# Patient Record
Sex: FEMALE | Race: WHITE | NOT HISPANIC OR LATINO | Employment: PART TIME | URBAN - METROPOLITAN AREA
[De-identification: names, ages, dates, MRNs, and addresses within clinical notes are randomized per-mention and may not be internally consistent; named-entity substitution may affect disease eponyms.]

---

## 2020-01-18 ENCOUNTER — HOSPITAL ENCOUNTER (EMERGENCY)
Facility: HOSPITAL | Age: 45
Discharge: HOME | End: 2020-01-18
Attending: EMERGENCY MEDICINE

## 2020-01-18 VITALS
HEART RATE: 80 BPM | RESPIRATION RATE: 16 BRPM | TEMPERATURE: 99 F | OXYGEN SATURATION: 99 % | WEIGHT: 125 LBS | DIASTOLIC BLOOD PRESSURE: 57 MMHG | BODY MASS INDEX: 23 KG/M2 | SYSTOLIC BLOOD PRESSURE: 108 MMHG | HEIGHT: 62 IN

## 2020-01-18 DIAGNOSIS — R52 PAIN: ICD-10-CM

## 2020-01-18 DIAGNOSIS — M25.561 ACUTE PAIN OF RIGHT KNEE: Primary | ICD-10-CM

## 2020-01-18 PROCEDURE — 99283 EMERGENCY DEPT VISIT LOW MDM: CPT | Mod: 25

## 2020-01-18 PROCEDURE — 25000003 PHARM REV CODE 250: Performed by: NURSE PRACTITIONER

## 2020-01-18 RX ORDER — HYDROCODONE BITARTRATE AND ACETAMINOPHEN 7.5; 325 MG/1; MG/1
1 TABLET ORAL EVERY 4 HOURS PRN
Qty: 18 TABLET | Refills: 0 | Status: SHIPPED | OUTPATIENT
Start: 2020-01-18

## 2020-01-18 RX ORDER — HYDROCODONE BITARTRATE AND ACETAMINOPHEN 7.5; 325 MG/1; MG/1
1 TABLET ORAL
Status: COMPLETED | OUTPATIENT
Start: 2020-01-18 | End: 2020-01-18

## 2020-01-18 RX ADMIN — HYDROCODONE BITARTRATE AND ACETAMINOPHEN 1 TABLET: 7.5; 325 TABLET ORAL at 08:01

## 2020-01-19 NOTE — ED NOTES
Pt c/o R knee pain after hurting it yesterday. Pt is resting on stretcher, NAD, aaax3 and has friend in room,.

## 2020-01-19 NOTE — ED PROVIDER NOTES
Encounter Date: 1/18/2020       History     Chief Complaint   Patient presents with    Knee Pain     right     Presents with R knee after stepping off a step stool yesterday  Has limited ROM flexing knee due to pain        Review of patient's allergies indicates:   Allergen Reactions    Sulfa (sulfonamide antibiotics) Rash     History reviewed. No pertinent past medical history.  Past Surgical History:   Procedure Laterality Date    HYSTERECTOMY       History reviewed. No pertinent family history.  Social History     Tobacco Use    Smoking status: Current Every Day Smoker     Types: Cigarettes    Smokeless tobacco: Never Used   Substance Use Topics    Alcohol use: Yes    Drug use: Yes     Types: Marijuana     Review of Systems   Constitutional: Negative for fever.   Respiratory: Negative for cough, shortness of breath and wheezing.    Cardiovascular: Negative for chest pain, palpitations and leg swelling.   Gastrointestinal: Negative for abdominal pain, diarrhea, nausea and vomiting.   Genitourinary: Negative for dysuria.   Musculoskeletal: Negative for back pain.        Right knee pain   Skin: Negative for rash.   Neurological: Negative for weakness.       Physical Exam     Initial Vitals [01/18/20 1846]   BP Pulse Resp Temp SpO2   (!) 108/57 80 16 98.7 °F (37.1 °C) 99 %      MAP       --         Physical Exam    Constitutional: She appears well-developed and well-nourished.   HENT:   Head: Normocephalic and atraumatic.   Eyes: Conjunctivae are normal.   Neck: Normal range of motion. Neck supple.   Cardiovascular: Normal rate and regular rhythm.   Pulmonary/Chest: Breath sounds normal. No respiratory distress.   Musculoskeletal: Normal range of motion.   Right knee with mild swelling  Increased pain with flexion.  Decreased ROM with flexion  Pt has full extension   Neurological: She is alert and oriented to person, place, and time. No sensory deficit. GCS score is 15. GCS eye subscore is 4. GCS verbal  subscore is 5. GCS motor subscore is 6.   Skin: Skin is warm and dry.   Psychiatric: She has a normal mood and affect. Thought content normal.         ED Course   Splint Application  Date/Time: 1/18/2020 8:17 PM  Performed by: Kelin Alejandre NP  Authorized by: Viki Ibrahim MD   Location details: right knee  Post-procedure: The splinted body part was neurovascularly unchanged following the procedure.  Patient tolerance: Patient tolerated the procedure well with no immediate complications  Comments: Knee immobilizer placed with crutch training        Labs Reviewed - No data to display       Imaging Results          X-Ray Knee Complete 4 or more Views Right (Final result)  Result time 01/18/20 19:30:31   Procedure changed from X-Ray Knee 3 View Right     Final result by Siddharth Frazier MD (01/18/20 19:30:31)                 Impression:      Normal right knee.      Electronically signed by: Siddharth Frazier MD  Date:    01/18/2020  Time:    19:30             Narrative:    EXAMINATION:  XR KNEE COMP 4 OR MORE VIEWS RIGHT    CLINICAL HISTORY:  pain;.  Pain, unspecified    COMPARISON:  None.    FINDINGS:  4 views are negative for fracture, dislocation, or osseous destructive lesion. No significant arthritic change or joint effusion is identified.                                 Medical Decision Making:   Initial Assessment:   Right knee pain  Have discussed this pt with DR. Ibrahim                                 Clinical Impression:       ICD-10-CM ICD-9-CM   1. Acute pain of right knee M25.561 719.46   2. Pain R52 780.96                             Kelin Alejandre NP  01/18/20 2016       Kelin Alejandre NP  01/18/20 2017

## 2020-01-27 ENCOUNTER — TELEPHONE (OUTPATIENT)
Dept: ORTHOPEDICS | Facility: CLINIC | Age: 45
End: 2020-01-27

## 2020-01-27 NOTE — TELEPHONE ENCOUNTER
Called patient to schedule referred appointment from [facility] at [facility] with Dr. Christine for treatment of right knee pain post fall.   Phone number on referral is [phone]. No answer at phone number [phone] and left voicemail for patient to call office at phone number [phone] to schedule an appointment with Dr. Christine.

## 2020-01-30 ENCOUNTER — TELEPHONE (OUTPATIENT)
Dept: ORTHOPEDICS | Facility: CLINIC | Age: 45
End: 2020-01-30

## 2020-01-30 NOTE — TELEPHONE ENCOUNTER
Called patient to schedule referred appointment from [facility] at [facility] with Dr. Christine for treatment of right knee pain post fall.     Patient informed me that she does not have insurance and cannot pay the copay. Patient is currently pending with financial assistance and will call back the office when it is approved to schedule an appointment.

## 2020-03-02 ENCOUNTER — OFFICE VISIT (OUTPATIENT)
Dept: ORTHOPEDICS | Facility: CLINIC | Age: 45
End: 2020-03-02

## 2020-03-02 VITALS — WEIGHT: 125 LBS | BODY MASS INDEX: 23 KG/M2 | OXYGEN SATURATION: 98 % | HEIGHT: 62 IN | HEART RATE: 88 BPM

## 2020-03-02 DIAGNOSIS — M71.21 BAKER CYST, RIGHT: ICD-10-CM

## 2020-03-02 DIAGNOSIS — S83.511A RUPTURE OF ANTERIOR CRUCIATE LIGAMENT OF RIGHT KNEE, INITIAL ENCOUNTER: Primary | ICD-10-CM

## 2020-03-02 DIAGNOSIS — S83.241A ACUTE MEDIAL MENISCUS TEAR OF RIGHT KNEE, INITIAL ENCOUNTER: ICD-10-CM

## 2020-03-02 PROCEDURE — 20610 DRAIN/INJ JOINT/BURSA W/O US: CPT | Mod: PBBFAC | Performed by: ORTHOPAEDIC SURGERY

## 2020-03-02 PROCEDURE — 99999 PR PBB SHADOW E&M-EST. PATIENT-LVL III: ICD-10-PCS | Mod: PBBFAC,,, | Performed by: ORTHOPAEDIC SURGERY

## 2020-03-02 PROCEDURE — 99204 OFFICE O/P NEW MOD 45 MIN: CPT | Mod: 25,S$PBB,, | Performed by: ORTHOPAEDIC SURGERY

## 2020-03-02 PROCEDURE — 99204 PR OFFICE/OUTPT VISIT, NEW, LEVL IV, 45-59 MIN: ICD-10-PCS | Mod: 25,S$PBB,, | Performed by: ORTHOPAEDIC SURGERY

## 2020-03-02 PROCEDURE — 20610 LARGE JOINT ASPIRATION/INJECTION: R KNEE: ICD-10-PCS | Mod: S$PBB,RT,, | Performed by: ORTHOPAEDIC SURGERY

## 2020-03-02 PROCEDURE — 99999 PR PBB SHADOW E&M-EST. PATIENT-LVL III: CPT | Mod: PBBFAC,,, | Performed by: ORTHOPAEDIC SURGERY

## 2020-03-02 PROCEDURE — 99213 OFFICE O/P EST LOW 20 MIN: CPT | Mod: PBBFAC,25 | Performed by: ORTHOPAEDIC SURGERY

## 2020-03-02 RX ORDER — TRIAMCINOLONE ACETONIDE 40 MG/ML
40 INJECTION, SUSPENSION INTRA-ARTICULAR; INTRAMUSCULAR
Status: DISCONTINUED | OUTPATIENT
Start: 2020-03-02 | End: 2020-03-02 | Stop reason: HOSPADM

## 2020-03-02 RX ADMIN — TRIAMCINOLONE ACETONIDE 40 MG: 40 INJECTION, SUSPENSION INTRA-ARTICULAR; INTRAMUSCULAR at 01:03

## 2020-03-02 NOTE — PROCEDURES
Large Joint Aspiration/Injection: R knee  Performed by: Dwayne Christine DO  Authorized by: Dwayne Christine DO  Date/Time: 3/2/2020 1:45 PM    Consent Done?:  Yes (Verbal)  Indications:  diagnostic evaluation and pain  Timeout: Immediately prior to procedure a time out was called to verify the correct patient, procedure, equipment, support staff and site/side marked as required.  Prep:Patient was prepped and draped in the usual sterile fashion.  Procedure site marked: Yes     Details:   Needle size: 22 G    Ultrasonic Guidance for needle placement: No   Approach: anterolateral  Location:  Knee  Site:  R knee    Medications: 40 mg triamcinolone acetonide 40 mg/mL  Patient tolerance:  patient tolerated the procedure well with no immediate complications

## 2020-03-02 NOTE — PROGRESS NOTES
Subjective:      Patient ID: Alysia Cruz is a 44 y.o. female.    Chief Complaint: Pain of the Right Knee    Referring Provider: Aaareferral Self  No address on file    HPI:   Ms. Cruz is a 44-year-old lady who presented today for evaluation of right knee pain which began on 01/17/2020 after she stepped off a step stool and her knee buckled causing her to twist her knee. She was seen at [facility] in [facility] on the date after injury and was placed in a knee immobilizer which she has subsequently stopped using.  Flexing her knee and squatting increase her symptoms while nothing seems to improve them.  She states that she feels like she has instability her knee and it gives out frequently.  She has swelling and giving way along with locking.  She has taken NSAIDs without help.  She has worn a sleeve brace without help.  She has not done physical therapy nor had injections.    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    ROS:   Review of Systems   Constitution: Negative for chills and  fever.   HENT: Negative for congestion.    Eyes: Negative for blurred vision.   Cardiovascular: Negative for chest pain.   Respiratory: Negative for cough.    Endocrine: Negative for polydipsia.   Hematologic/Lymphatic: Negative for adenopathy.   Skin: Negative for flushing, itching and skin cancer.   Musculoskeletal: Positive for joint pain and joint swelling. Negative for gout.   Gastrointestinal: Negative for constipation, diarrhea and heartburn.   Genitourinary: Negative for nocturia.   Neurological: Negative for headaches and seizures.   Psychiatric/Behavioral: Negative for depression and substance abuse. The patient is not nervous/anxious.    Allergic/Immunologic: Negative for environmental allergies.           Objective:      Physical Exam:   General: AAOx3.  No acute distress  HEENT: Normocephalic, PEARLA EOMI, poor dentition with missing teeth  Neck: Supple, No JVD  Chest: Symetric, equal excursion on inspiration  Abdomen: Soft NTND  Vascular:  Pulses intact and equal bilaterally.  Capillary refill less than 3 seconds and equal bilaterally  Neurologic:  Pinprick and soft touch intact and equal bilaterally  Integment:  No ecchymosis, no errythema  Extremity:  Knee:  Extension/flexion equal right knee-1/128 degrees, left knee 0/130 degrees. No crepitus with motion either knee.  Mild effusion right knee. Baker cyst right knee. Negative patellar load/compression both knees.  Negative patella apprehension/relocation both knees.  Positive drop home right knee. Varus/valgus stressing equal bilaterally with endpoint.  Increased excursion with Lachman's/drawer right knee. Positive medial joint line tenderness right knee. Florentin positive right knee.  [place] positive right knee.  Nontender at the anserine insertion both knees.  No swelling at the anserine insertion both knees.  Radiography:  Personally reviewed x-rays of the right knee completed on 01/18/2020 showed no fracture dislocation.        Assessment:        Impression:      1. Rupture of anterior cruciate ligament of right knee, initial encounter    2.  3. Acute medial meniscus tear of right knee, initial encounter   Hughes cyst right knee         Plan:       1.  Discussed physical examination and radiographic findings with the patient. Alysia understands that she appears to have rupture ACL and torn her medial meniscus.  She could be treated conservatively or consider surgical intervention.  Before any surgical intervention could be considered she will need a MRI of her knee to fully evaluated. In the interim she can proceed with conservative measures until the MRI is completed.  2.  Refer for an MRI of the right knee, a referral was given.  3.  Final recommendations after MRI is completed.  4.  Offered a steroid injection to the right knee, she elected to proceed.  5.  Discussed brace wear with the patient. She understands she will need to get an ACL brace in the future.  6.  Take NSAIDs as tolerated allowed by PCM.  7.  Home exercises to include quadriceps and hamstring strengthening were shown discussed.  8.  Discussed possible referral to physical therapy she would prefer to wait until after it is decided whether she should proceed with surgery or not.  9.  [facility] portal was discussed with the patient and information was given.  The patient was encouraged to use the portal for future encounters.  10.  Follow up after MRI is completed.

## 2020-03-09 ENCOUNTER — HOSPITAL ENCOUNTER (OUTPATIENT)
Dept: RADIOLOGY | Facility: HOSPITAL | Age: 45
Discharge: HOME | End: 2020-03-09
Attending: ORTHOPAEDIC SURGERY

## 2020-03-09 DIAGNOSIS — S83.511A RUPTURE OF ANTERIOR CRUCIATE LIGAMENT OF RIGHT KNEE, INITIAL ENCOUNTER: ICD-10-CM

## 2020-03-09 PROCEDURE — 73721 MRI JNT OF LWR EXTRE W/O DYE: CPT | Mod: 26,RT,, | Performed by: RADIOLOGY

## 2020-03-09 PROCEDURE — 73721 MRI KNEE WITHOUT CONTRAST RIGHT: ICD-10-PCS | Mod: 26,RT,, | Performed by: RADIOLOGY

## 2020-03-09 PROCEDURE — 73721 MRI JNT OF LWR EXTRE W/O DYE: CPT | Mod: TC,RT

## 2020-03-11 ENCOUNTER — OFFICE VISIT (OUTPATIENT)
Dept: ORTHOPEDICS | Facility: CLINIC | Age: 45
End: 2020-03-11

## 2020-03-11 VITALS — TEMPERATURE: 98 F | HEIGHT: 62 IN | WEIGHT: 125 LBS | BODY MASS INDEX: 23 KG/M2 | HEART RATE: 69 BPM

## 2020-03-11 DIAGNOSIS — S83.511A RUPTURE OF ANTERIOR CRUCIATE LIGAMENT OF RIGHT KNEE, INITIAL ENCOUNTER: ICD-10-CM

## 2020-03-11 DIAGNOSIS — M71.21 BAKER CYST, RIGHT: ICD-10-CM

## 2020-03-11 DIAGNOSIS — S83.241A ACUTE MEDIAL MENISCUS TEAR OF RIGHT KNEE, INITIAL ENCOUNTER: Primary | ICD-10-CM

## 2020-03-11 DIAGNOSIS — S83.511D RUPTURE OF ANTERIOR CRUCIATE LIGAMENT OF RIGHT KNEE, SUBSEQUENT ENCOUNTER: ICD-10-CM

## 2020-03-11 PROCEDURE — 99213 OFFICE O/P EST LOW 20 MIN: CPT | Mod: S$PBB,,, | Performed by: ORTHOPAEDIC SURGERY

## 2020-03-11 PROCEDURE — 99213 PR OFFICE/OUTPT VISIT, EST, LEVL III, 20-29 MIN: ICD-10-PCS | Mod: S$PBB,,, | Performed by: ORTHOPAEDIC SURGERY

## 2020-03-11 PROCEDURE — 99999 PR PBB SHADOW E&M-EST. PATIENT-LVL III: CPT | Mod: PBBFAC,,, | Performed by: ORTHOPAEDIC SURGERY

## 2020-03-11 PROCEDURE — 99213 OFFICE O/P EST LOW 20 MIN: CPT | Mod: PBBFAC,PN | Performed by: ORTHOPAEDIC SURGERY

## 2020-03-11 PROCEDURE — 99999 PR PBB SHADOW E&M-EST. PATIENT-LVL III: ICD-10-PCS | Mod: PBBFAC,,, | Performed by: ORTHOPAEDIC SURGERY

## 2020-03-11 NOTE — PROGRESS NOTES
Subjective:      Patient ID: Alysia Cruz is a 44 y.o. female.    Chief Complaint: Pain of the Right Knee and Knee Pain (MRI right Knee Results// Last injection 3/2 Right knee)      HPI: Ms. Cruz returns today with complaints of persistent pain in her right knee. She injured her knee on 01/17/2020 after she stepped off a step stool and her knee buckled causing her to twist her knee. She is now nearly 2 months post injury.  She still has pain out of proportion to what one would expect at this point post injury.  Flexing her knee and squatting still increase her symptoms.  She feels like she has instability of her knee and her knee gives out frequently.  She still has swelling and giving way along with locking.  She has not gotten ACL brace yet.    ROS:  No new diagnosis/surgery/prescriptions since last office visit on 03/02/2020.  Constitution: Negative for chills and fever.   HENT: Negative for congestion.    Eyes: Negative for blurred vision.   Cardiovascular: Negative for chest pain.   Respiratory: Negative for cough.    Endocrine: Negative for polydipsia.   Hematologic/Lymphatic: Negative for adenopathy.   Skin: Negative for flushing, itching and skin cancer.   Musculoskeletal: Positive for joint pain and joint swelling. Negative for gout.   Gastrointestinal: Negative for constipation, diarrhea and heartburn.   Genitourinary: Negative for nocturia.   Neurological: Negative for headaches and seizures.   Psychiatric/Behavioral: Negative for depression and substance abuse. The patient is not nervous/anxious.    Allergic/Immunologic: Negative for environmental allergies.       Objective:      Physical Exam:   General:  Pain out of proportion to what one would expect at this point post injury.  AAOx3.  No acute distress  Vascular:  Pulses intact and equal bilaterally.  Capillary refill less than 3 seconds and equal bilaterally  Neurologic:  Pinprick and soft touch intact and equal bilaterally  Integment:  No  ecchymosis, no errythema  Extremity:  Knee:  Extension/flexion equal right knee-1/128 degrees, left knee 0/130 degrees. No crepitus with motion either knee.  Mild effusion right knee. Baker cyst right knee. Negative patellar load/compression both knees.  Negative patella apprehension/relocation both knees.  Positive drop home right knee. Varus/valgus stressing equal bilaterally with endpoint.  Increased excursion with Lachman's/drawer right knee. Positive medial joint line tenderness right knee. Florentin positive right knee.  [place] positive right knee.  Nontender at the anserine insertion both knees.  No swelling at the anserine insertion both knees.  Radiography:  Personally reviewed MRI of the right knee completed on 03/09/2020 showed a complete rupture of the ACL and a medial meniscus tear with posteromedial subchondral edema.      Assessment:       Impression:   1.  ACL rupture, right knee.  2.  Medial meniscus tear, right knee.  3.  Hughes cyst, right knee      Plan:       1.  Discussed physical examination and radiographic findings with the patient. Alysia understands that she has ruptured her ACL and since her joint surfaces appear to be fairly stable on x-ray and MRI she could be considered for ACL reconstruction to stabilize her knee. She understands that ACL reconstruction is a 3 month process and she will need to be able to be nonweightbearing for 6 weeks and will not be able to lift any items nor participate in long distance walking for a minimum of 4-5 months.  She understands typically it takes 6-8 months after an ACL reconstruction is completed to return to full unrestricted activities.  Discussed with the patient and she will need to be able to go to physical therapy postoperatively or she may have a poor outcome.  2.  Offered to schedule the patient for arthroscopic ACL reconstruction.  She stated she would need to discuss financing with [facility] and also discussed with her place of  employment if they can accommodate postoperative work restrictions.  She stated she would contact this office after she discusses these issues with [facility] and her employer.  3.  Discussed in detail with the patient the importance of purchasing an ACL brace to wear.  She understands she can go online and find an inexpensive ACL brace to wear as she needs it now and will need it postop in the event she proceed with surgery.  4.  Any pain can be treated with over-the-counter medications dosed per box instructions.  5.  Offered referred to physical therapy she stated she needed checked with [facility] to see if they would finance her physical therapy.  6.  Continue with home exercises to include quadriceps and hamstring strengthening.  7.  [facility] portal was discussed with the patient and information was given.  The patient was encouraged to use the portal for future encounters.  8.  Follow up when patient decides how she wants to proceed with her care.

## 2024-10-29 ENCOUNTER — HOSPITAL ENCOUNTER (EMERGENCY)
Facility: HOSPITAL | Age: 49
Discharge: HOME | End: 2024-10-29
Attending: EMERGENCY MEDICINE

## 2024-10-29 VITALS
SYSTOLIC BLOOD PRESSURE: 155 MMHG | HEART RATE: 58 BPM | HEIGHT: 62 IN | BODY MASS INDEX: 22.08 KG/M2 | RESPIRATION RATE: 18 BRPM | OXYGEN SATURATION: 99 % | DIASTOLIC BLOOD PRESSURE: 71 MMHG | TEMPERATURE: 98 F | WEIGHT: 120 LBS

## 2024-10-29 DIAGNOSIS — R11.0 NAUSEA: ICD-10-CM

## 2024-10-29 DIAGNOSIS — K57.92 DIVERTICULITIS: Primary | ICD-10-CM

## 2024-10-29 LAB
ALBUMIN SERPL BCP-MCNC: 3.9 G/DL (ref 3.5–5.2)
ALP SERPL-CCNC: 88 U/L (ref 40–150)
ALT SERPL W/O P-5'-P-CCNC: 10 U/L (ref 10–44)
ANION GAP SERPL CALC-SCNC: 13 MMOL/L (ref 8–16)
AST SERPL-CCNC: 11 U/L (ref 10–40)
BACTERIA #/AREA URNS HPF: ABNORMAL /HPF
BASOPHILS # BLD AUTO: 0.07 K/UL (ref 0–0.2)
BASOPHILS NFR BLD: 0.5 % (ref 0–1.9)
BILIRUB SERPL-MCNC: 0.8 MG/DL (ref 0.1–1)
BILIRUB UR QL STRIP: NEGATIVE
BUN SERPL-MCNC: 19 MG/DL (ref 6–20)
CALCIUM SERPL-MCNC: 9.9 MG/DL (ref 8.7–10.5)
CHLORIDE SERPL-SCNC: 106 MMOL/L (ref 95–110)
CLARITY UR: ABNORMAL
CO2 SERPL-SCNC: 21 MMOL/L (ref 23–29)
COLOR UR: YELLOW
CREAT SERPL-MCNC: 0.7 MG/DL (ref 0.5–1.4)
DIFFERENTIAL METHOD BLD: ABNORMAL
EOSINOPHIL # BLD AUTO: 0 K/UL (ref 0–0.5)
EOSINOPHIL NFR BLD: 0 % (ref 0–8)
ERYTHROCYTE [DISTWIDTH] IN BLOOD BY AUTOMATED COUNT: 12.3 % (ref 11.5–14.5)
EST. GFR  (NO RACE VARIABLE): >60 ML/MIN/1.73 M^2
GLUCOSE SERPL-MCNC: 140 MG/DL (ref 70–110)
GLUCOSE UR QL STRIP: NEGATIVE
HCT VFR BLD AUTO: 39.9 % (ref 37–48.5)
HCV AB SERPL QL IA: NORMAL
HGB BLD-MCNC: 13.5 G/DL (ref 12–16)
HGB UR QL STRIP: ABNORMAL
HIV 1+2 AB+HIV1 P24 AG SERPL QL IA: NORMAL
HYALINE CASTS #/AREA URNS LPF: 0 /LPF
IMM GRANULOCYTES # BLD AUTO: 0.05 K/UL (ref 0–0.04)
IMM GRANULOCYTES NFR BLD AUTO: 0.3 % (ref 0–0.5)
KETONES UR QL STRIP: ABNORMAL
LACTATE SERPL-SCNC: 1 MMOL/L (ref 0.5–2.2)
LEUKOCYTE ESTERASE UR QL STRIP: NEGATIVE
LYMPHOCYTES # BLD AUTO: 0.7 K/UL (ref 1–4.8)
LYMPHOCYTES NFR BLD: 4.3 % (ref 18–48)
MCH RBC QN AUTO: 31.2 PG (ref 27–31)
MCHC RBC AUTO-ENTMCNC: 33.8 G/DL (ref 32–36)
MCV RBC AUTO: 92 FL (ref 82–98)
MICROSCOPIC COMMENT: ABNORMAL
MONOCYTES # BLD AUTO: 0.4 K/UL (ref 0.3–1)
MONOCYTES NFR BLD: 2.4 % (ref 4–15)
NEUTROPHILS # BLD AUTO: 14.3 K/UL (ref 1.8–7.7)
NEUTROPHILS NFR BLD: 92.5 % (ref 38–73)
NITRITE UR QL STRIP: NEGATIVE
NRBC BLD-RTO: 0 /100 WBC
PH UR STRIP: 7 [PH] (ref 5–8)
PLATELET # BLD AUTO: 208 K/UL (ref 150–450)
PMV BLD AUTO: 12.4 FL (ref 9.2–12.9)
POTASSIUM SERPL-SCNC: 3.8 MMOL/L (ref 3.5–5.1)
PROT SERPL-MCNC: 7.2 G/DL (ref 6–8.4)
PROT UR QL STRIP: ABNORMAL
RBC # BLD AUTO: 4.33 M/UL (ref 4–5.4)
RBC #/AREA URNS HPF: 13 /HPF (ref 0–4)
SODIUM SERPL-SCNC: 140 MMOL/L (ref 136–145)
SP GR UR STRIP: 1.02 (ref 1–1.03)
SQUAMOUS #/AREA URNS HPF: 3 /HPF
URN SPEC COLLECT METH UR: ABNORMAL
UROBILINOGEN UR STRIP-ACNC: NEGATIVE EU/DL
WBC # BLD AUTO: 15.46 K/UL (ref 3.9–12.7)
WBC #/AREA URNS HPF: 2 /HPF (ref 0–5)

## 2024-10-29 PROCEDURE — 25000003 PHARM REV CODE 250: Performed by: STUDENT IN AN ORGANIZED HEALTH CARE EDUCATION/TRAINING PROGRAM

## 2024-10-29 PROCEDURE — 94760 N-INVAS EAR/PLS OXIMETRY 1: CPT

## 2024-10-29 PROCEDURE — 87040 BLOOD CULTURE FOR BACTERIA: CPT | Mod: 59 | Performed by: STUDENT IN AN ORGANIZED HEALTH CARE EDUCATION/TRAINING PROGRAM

## 2024-10-29 PROCEDURE — 63600175 PHARM REV CODE 636 W HCPCS: Performed by: EMERGENCY MEDICINE

## 2024-10-29 PROCEDURE — 74177 CT ABD & PELVIS W/CONTRAST: CPT | Mod: TC

## 2024-10-29 PROCEDURE — 87389 HIV-1 AG W/HIV-1&-2 AB AG IA: CPT | Performed by: INTERNAL MEDICINE

## 2024-10-29 PROCEDURE — 25500020 PHARM REV CODE 255: Performed by: EMERGENCY MEDICINE

## 2024-10-29 PROCEDURE — 63600175 PHARM REV CODE 636 W HCPCS: Performed by: STUDENT IN AN ORGANIZED HEALTH CARE EDUCATION/TRAINING PROGRAM

## 2024-10-29 PROCEDURE — 83605 ASSAY OF LACTIC ACID: CPT | Performed by: STUDENT IN AN ORGANIZED HEALTH CARE EDUCATION/TRAINING PROGRAM

## 2024-10-29 PROCEDURE — 74177 CT ABD & PELVIS W/CONTRAST: CPT | Mod: 26,,, | Performed by: RADIOLOGY

## 2024-10-29 PROCEDURE — 96361 HYDRATE IV INFUSION ADD-ON: CPT

## 2024-10-29 PROCEDURE — 96375 TX/PRO/DX INJ NEW DRUG ADDON: CPT

## 2024-10-29 PROCEDURE — 80053 COMPREHEN METABOLIC PANEL: CPT | Performed by: EMERGENCY MEDICINE

## 2024-10-29 PROCEDURE — 96376 TX/PRO/DX INJ SAME DRUG ADON: CPT

## 2024-10-29 PROCEDURE — 96365 THER/PROPH/DIAG IV INF INIT: CPT

## 2024-10-29 PROCEDURE — 94761 N-INVAS EAR/PLS OXIMETRY MLT: CPT

## 2024-10-29 PROCEDURE — 81000 URINALYSIS NONAUTO W/SCOPE: CPT | Performed by: EMERGENCY MEDICINE

## 2024-10-29 PROCEDURE — 99285 EMERGENCY DEPT VISIT HI MDM: CPT | Mod: 25

## 2024-10-29 PROCEDURE — 86803 HEPATITIS C AB TEST: CPT | Performed by: INTERNAL MEDICINE

## 2024-10-29 PROCEDURE — 85025 COMPLETE CBC W/AUTO DIFF WBC: CPT | Performed by: EMERGENCY MEDICINE

## 2024-10-29 RX ORDER — CIPROFLOXACIN 500 MG/1
500 TABLET ORAL EVERY 12 HOURS
Qty: 14 TABLET | Refills: 0 | Status: SHIPPED | OUTPATIENT
Start: 2024-10-29 | End: 2024-11-05

## 2024-10-29 RX ORDER — MORPHINE SULFATE 2 MG/ML
4 INJECTION, SOLUTION INTRAMUSCULAR; INTRAVENOUS
Status: COMPLETED | OUTPATIENT
Start: 2024-10-29 | End: 2024-10-29

## 2024-10-29 RX ORDER — CEFTRIAXONE 1 G/1
1 INJECTION, POWDER, FOR SOLUTION INTRAMUSCULAR; INTRAVENOUS
Status: COMPLETED | OUTPATIENT
Start: 2024-10-29 | End: 2024-10-29

## 2024-10-29 RX ORDER — CIPROFLOXACIN 500 MG/1
500 TABLET ORAL EVERY 12 HOURS
Qty: 14 TABLET | Refills: 0 | Status: SHIPPED | OUTPATIENT
Start: 2024-10-29 | End: 2024-10-29

## 2024-10-29 RX ORDER — ONDANSETRON HYDROCHLORIDE 2 MG/ML
4 INJECTION, SOLUTION INTRAVENOUS
Status: COMPLETED | OUTPATIENT
Start: 2024-10-29 | End: 2024-10-29

## 2024-10-29 RX ORDER — ONDANSETRON 4 MG/1
4 TABLET, ORALLY DISINTEGRATING ORAL EVERY 6 HOURS PRN
Qty: 20 TABLET | Refills: 0 | Status: SHIPPED | OUTPATIENT
Start: 2024-10-29

## 2024-10-29 RX ORDER — METRONIDAZOLE 500 MG/1
500 TABLET ORAL EVERY 8 HOURS
Qty: 21 TABLET | Refills: 0 | Status: SHIPPED | OUTPATIENT
Start: 2024-10-29 | End: 2024-11-05

## 2024-10-29 RX ORDER — HYDROMORPHONE HYDROCHLORIDE 1 MG/ML
1 INJECTION, SOLUTION INTRAMUSCULAR; INTRAVENOUS; SUBCUTANEOUS
Status: COMPLETED | OUTPATIENT
Start: 2024-10-29 | End: 2024-10-29

## 2024-10-29 RX ADMIN — HYDROMORPHONE HYDROCHLORIDE 1 MG: 1 INJECTION, SOLUTION INTRAMUSCULAR; INTRAVENOUS; SUBCUTANEOUS at 07:10

## 2024-10-29 RX ADMIN — IOHEXOL 75 ML: 350 INJECTION, SOLUTION INTRAVENOUS at 06:10

## 2024-10-29 RX ADMIN — MORPHINE SULFATE 4 MG: 2 INJECTION, SOLUTION INTRAMUSCULAR; INTRAVENOUS at 02:10

## 2024-10-29 RX ADMIN — ONDANSETRON 4 MG: 2 INJECTION INTRAMUSCULAR; INTRAVENOUS at 02:10

## 2024-10-29 RX ADMIN — MORPHINE SULFATE 4 MG: 2 INJECTION, SOLUTION INTRAMUSCULAR; INTRAVENOUS at 04:10

## 2024-10-29 RX ADMIN — SODIUM CHLORIDE, POTASSIUM CHLORIDE, SODIUM LACTATE AND CALCIUM CHLORIDE 1000 ML: 600; 310; 30; 20 INJECTION, SOLUTION INTRAVENOUS at 03:10

## 2024-10-29 RX ADMIN — CEFTRIAXONE SODIUM 1 G: 1 INJECTION, POWDER, FOR SOLUTION INTRAMUSCULAR; INTRAVENOUS at 05:10

## 2024-10-29 RX ADMIN — PIPERACILLIN SODIUM AND TAZOBACTAM SODIUM 4.5 G: 4; .5 INJECTION, POWDER, FOR SOLUTION INTRAVENOUS at 07:10

## 2024-10-31 ENCOUNTER — HOSPITAL ENCOUNTER (EMERGENCY)
Facility: HOSPITAL | Age: 49
Discharge: HOME | End: 2024-10-31
Attending: EMERGENCY MEDICINE

## 2024-10-31 VITALS
OXYGEN SATURATION: 100 % | HEIGHT: 62 IN | RESPIRATION RATE: 16 BRPM | SYSTOLIC BLOOD PRESSURE: 154 MMHG | WEIGHT: 120 LBS | HEART RATE: 62 BPM | BODY MASS INDEX: 22.08 KG/M2 | TEMPERATURE: 98 F | DIASTOLIC BLOOD PRESSURE: 81 MMHG

## 2024-10-31 DIAGNOSIS — K57.92 DIVERTICULITIS: Primary | ICD-10-CM

## 2024-10-31 LAB
ALBUMIN SERPL BCP-MCNC: 3.6 G/DL (ref 3.5–5.2)
ALP SERPL-CCNC: 78 U/L (ref 40–150)
ALT SERPL W/O P-5'-P-CCNC: 10 U/L (ref 10–44)
ANION GAP SERPL CALC-SCNC: 12 MMOL/L (ref 8–16)
AST SERPL-CCNC: 12 U/L (ref 10–40)
BASOPHILS # BLD AUTO: 0.07 K/UL (ref 0–0.2)
BASOPHILS NFR BLD: 0.7 % (ref 0–1.9)
BILIRUB SERPL-MCNC: 0.9 MG/DL (ref 0.1–1)
BILIRUB UR QL STRIP: NEGATIVE
BUN SERPL-MCNC: 12 MG/DL (ref 6–20)
CALCIUM SERPL-MCNC: 9.6 MG/DL (ref 8.7–10.5)
CHLORIDE SERPL-SCNC: 106 MMOL/L (ref 95–110)
CLARITY UR: CLEAR
CO2 SERPL-SCNC: 22 MMOL/L (ref 23–29)
COLOR UR: YELLOW
CREAT SERPL-MCNC: 0.7 MG/DL (ref 0.5–1.4)
DIFFERENTIAL METHOD BLD: ABNORMAL
EOSINOPHIL # BLD AUTO: 0.1 K/UL (ref 0–0.5)
EOSINOPHIL NFR BLD: 0.7 % (ref 0–8)
ERYTHROCYTE [DISTWIDTH] IN BLOOD BY AUTOMATED COUNT: 12.1 % (ref 11.5–14.5)
EST. GFR  (NO RACE VARIABLE): >60 ML/MIN/1.73 M^2
GLUCOSE SERPL-MCNC: 87 MG/DL (ref 70–110)
GLUCOSE UR QL STRIP: NEGATIVE
HCT VFR BLD AUTO: 37.5 % (ref 37–48.5)
HGB BLD-MCNC: 12.9 G/DL (ref 12–16)
HGB UR QL STRIP: NEGATIVE
IMM GRANULOCYTES # BLD AUTO: 0.03 K/UL (ref 0–0.04)
IMM GRANULOCYTES NFR BLD AUTO: 0.3 % (ref 0–0.5)
KETONES UR QL STRIP: ABNORMAL
LACTATE SERPL-SCNC: 0.8 MMOL/L (ref 0.5–2.2)
LEUKOCYTE ESTERASE UR QL STRIP: NEGATIVE
LIPASE SERPL-CCNC: 19 U/L (ref 4–60)
LYMPHOCYTES # BLD AUTO: 1.8 K/UL (ref 1–4.8)
LYMPHOCYTES NFR BLD: 17.7 % (ref 18–48)
MCH RBC QN AUTO: 31.2 PG (ref 27–31)
MCHC RBC AUTO-ENTMCNC: 34.4 G/DL (ref 32–36)
MCV RBC AUTO: 91 FL (ref 82–98)
MONOCYTES # BLD AUTO: 0.6 K/UL (ref 0.3–1)
MONOCYTES NFR BLD: 5.8 % (ref 4–15)
NEUTROPHILS # BLD AUTO: 7.7 K/UL (ref 1.8–7.7)
NEUTROPHILS NFR BLD: 74.8 % (ref 38–73)
NITRITE UR QL STRIP: NEGATIVE
NRBC BLD-RTO: 0 /100 WBC
PH UR STRIP: 7 [PH] (ref 5–8)
PLATELET # BLD AUTO: 242 K/UL (ref 150–450)
PMV BLD AUTO: 11.2 FL (ref 9.2–12.9)
POTASSIUM SERPL-SCNC: 3.4 MMOL/L (ref 3.5–5.1)
PROT SERPL-MCNC: 6.9 G/DL (ref 6–8.4)
PROT UR QL STRIP: NEGATIVE
RBC # BLD AUTO: 4.14 M/UL (ref 4–5.4)
SODIUM SERPL-SCNC: 140 MMOL/L (ref 136–145)
SP GR UR STRIP: 1.01 (ref 1–1.03)
URN SPEC COLLECT METH UR: ABNORMAL
UROBILINOGEN UR STRIP-ACNC: NEGATIVE EU/DL
WBC # BLD AUTO: 10.23 K/UL (ref 3.9–12.7)

## 2024-10-31 PROCEDURE — 83690 ASSAY OF LIPASE: CPT | Performed by: EMERGENCY MEDICINE

## 2024-10-31 PROCEDURE — 81003 URINALYSIS AUTO W/O SCOPE: CPT | Performed by: EMERGENCY MEDICINE

## 2024-10-31 PROCEDURE — 85025 COMPLETE CBC W/AUTO DIFF WBC: CPT | Performed by: EMERGENCY MEDICINE

## 2024-10-31 PROCEDURE — 74177 CT ABD & PELVIS W/CONTRAST: CPT | Mod: TC

## 2024-10-31 PROCEDURE — 74177 CT ABD & PELVIS W/CONTRAST: CPT | Mod: 26,,, | Performed by: RADIOLOGY

## 2024-10-31 PROCEDURE — 80053 COMPREHEN METABOLIC PANEL: CPT | Performed by: EMERGENCY MEDICINE

## 2024-10-31 PROCEDURE — 96374 THER/PROPH/DIAG INJ IV PUSH: CPT

## 2024-10-31 PROCEDURE — 96376 TX/PRO/DX INJ SAME DRUG ADON: CPT

## 2024-10-31 PROCEDURE — 99285 EMERGENCY DEPT VISIT HI MDM: CPT | Mod: 25

## 2024-10-31 PROCEDURE — 83605 ASSAY OF LACTIC ACID: CPT | Performed by: EMERGENCY MEDICINE

## 2024-10-31 PROCEDURE — 63600175 PHARM REV CODE 636 W HCPCS: Performed by: EMERGENCY MEDICINE

## 2024-10-31 PROCEDURE — 96375 TX/PRO/DX INJ NEW DRUG ADDON: CPT

## 2024-10-31 PROCEDURE — 25500020 PHARM REV CODE 255: Performed by: EMERGENCY MEDICINE

## 2024-10-31 RX ORDER — OXYCODONE AND ACETAMINOPHEN 5; 325 MG/1; MG/1
1 TABLET ORAL EVERY 6 HOURS PRN
Qty: 12 TABLET | Refills: 0 | Status: SHIPPED | OUTPATIENT
Start: 2024-10-31

## 2024-10-31 RX ORDER — ONDANSETRON HYDROCHLORIDE 2 MG/ML
4 INJECTION, SOLUTION INTRAVENOUS
Status: COMPLETED | OUTPATIENT
Start: 2024-10-31 | End: 2024-10-31

## 2024-10-31 RX ORDER — MORPHINE SULFATE 2 MG/ML
4 INJECTION, SOLUTION INTRAMUSCULAR; INTRAVENOUS
Status: COMPLETED | OUTPATIENT
Start: 2024-10-31 | End: 2024-10-31

## 2024-10-31 RX ORDER — HYDROMORPHONE HYDROCHLORIDE 1 MG/ML
1 INJECTION, SOLUTION INTRAMUSCULAR; INTRAVENOUS; SUBCUTANEOUS
Status: COMPLETED | OUTPATIENT
Start: 2024-10-31 | End: 2024-10-31

## 2024-10-31 RX ADMIN — IOHEXOL 75 ML: 350 INJECTION, SOLUTION INTRAVENOUS at 05:10

## 2024-10-31 RX ADMIN — ONDANSETRON 4 MG: 2 INJECTION INTRAMUSCULAR; INTRAVENOUS at 06:10

## 2024-10-31 RX ADMIN — ONDANSETRON 4 MG: 2 INJECTION INTRAMUSCULAR; INTRAVENOUS at 03:10

## 2024-10-31 RX ADMIN — HYDROMORPHONE HYDROCHLORIDE 1 MG: 1 INJECTION, SOLUTION INTRAMUSCULAR; INTRAVENOUS; SUBCUTANEOUS at 03:10

## 2024-10-31 RX ADMIN — MORPHINE SULFATE 4 MG: 2 INJECTION, SOLUTION INTRAMUSCULAR; INTRAVENOUS at 06:10

## 2024-10-31 NOTE — Clinical Note
"Alysia Cabreracharles Cruz was seen and treated in our emergency department on 10/31/2024.  She may return to work on 11/07/2024.       If you have any questions or concerns, please don't hesitate to call.      Manuel SEVILLA RN    "

## 2024-10-31 NOTE — Clinical Note
"Alysia Suarezbrandy Cruz was seen and treated in our emergency department on 10/31/2024.  She may return to work on 11/08/2024.       If you have any questions or concerns, please don't hesitate to call.      ANIL Marcum RN    "

## 2024-10-31 NOTE — ED PROVIDER NOTES
Encounter Date: 10/31/2024       History     Chief Complaint   Patient presents with    Abdominal Pain     Generalized abdominal pain x 2 weeks. Was seen here in ED for same a couple of days ago.     49-year-old female here from home via private vehicle complaining of lower abdominal pain.  Pain is worse in the suprapubic region.  She was seen here 2 days ago for similar symptoms and was found have sigmoid diverticulitis.  She was sent home with Cipro and Flagyl, which she states that she has been compliant with.  She states that despite the medications, she woke up this morning with pain worse than before.  She has been suffering through the pain all day and decided that she could not take it anymore so she came here.  Mild nausea no vomiting.  Positive for loose stools.  Denies any bloody stools but states her stool was somewhat dark and watery this morning.  No fever.      Review of patient's allergies indicates:   Allergen Reactions    Sulfa (sulfonamide antibiotics) Rash     No past medical history on file.  Past Surgical History:   Procedure Laterality Date    HYSTERECTOMY       Family History   Problem Relation Name Age of Onset    Hypertension Mother      Lupus Mother      Arthritis Mother      Cancer Mother      Hypertension Father      Arthritis Father      Seizures Father      Hypertension Sister      Arthritis Sister      Diabetes Brother      Hypertension Brother       Social History     Tobacco Use    Smoking status: Every Day     Types: Cigarettes    Smokeless tobacco: Never   Substance Use Topics    Alcohol use: Yes    Drug use: Yes     Types: Marijuana     Review of Systems   Constitutional:  Negative for chills and fever.   Gastrointestinal:  Positive for abdominal pain, diarrhea and nausea. Negative for blood in stool and vomiting.   All other systems reviewed and are negative.      Physical Exam     Initial Vitals [10/31/24 1524]   BP Pulse Resp Temp SpO2   (!) 151/69 65 20 98 °F (36.7 °C) 100 %       MAP       --         Physical Exam    Nursing note and vitals reviewed.  Constitutional: She appears well-developed and well-nourished. She is not diaphoretic. No distress.   HENT:   Head: Normocephalic and atraumatic.   Nose: Nose normal. Mouth/Throat: Oropharynx is clear and moist. No oropharyngeal exudate.   Eyes: Conjunctivae and EOM are normal. Pupils are equal, round, and reactive to light. No scleral icterus.   Neck: Neck supple. No JVD present.   Normal range of motion.  Cardiovascular:  Normal rate, regular rhythm, normal heart sounds and intact distal pulses.           No murmur heard.  Pulmonary/Chest: Breath sounds normal. No stridor. No respiratory distress. She has no wheezes. She has no rhonchi.   Abdominal: Abdomen is soft. Bowel sounds are normal. She exhibits no distension. There is abdominal tenderness (Lower abdomen/suprapubic). There is no rebound and no guarding.   Musculoskeletal:         General: No tenderness or edema. Normal range of motion.      Cervical back: Normal range of motion and neck supple.     Neurological: She is alert and oriented to person, place, and time. She has normal strength. No cranial nerve deficit or sensory deficit. GCS score is 15. GCS eye subscore is 4. GCS verbal subscore is 5. GCS motor subscore is 6.   Skin: Skin is warm and dry. Capillary refill takes less than 2 seconds. No rash noted. No erythema.   Psychiatric: She has a normal mood and affect. Her behavior is normal.         ED Course   Procedures  Labs Reviewed   CBC W/ AUTO DIFFERENTIAL - Abnormal       Result Value    WBC 10.23      RBC 4.14      Hemoglobin 12.9      Hematocrit 37.5      MCV 91      MCH 31.2 (*)     MCHC 34.4      RDW 12.1      Platelets 242      MPV 11.2      Immature Granulocytes 0.3      Gran # (ANC) 7.7      Immature Grans (Abs) 0.03      Lymph # 1.8      Mono # 0.6      Eos # 0.1      Baso # 0.07      nRBC 0      Gran % 74.8 (*)     Lymph % 17.7 (*)     Mono % 5.8       Eosinophil % 0.7      Basophil % 0.7      Differential Method Automated     COMPREHENSIVE METABOLIC PANEL - Abnormal    Sodium 140      Potassium 3.4 (*)     Chloride 106      CO2 22 (*)     Glucose 87      BUN 12      Creatinine 0.7      Calcium 9.6      Total Protein 6.9      Albumin 3.6      Total Bilirubin 0.9      Alkaline Phosphatase 78      AST 12      ALT 10      eGFR >60.0      Anion Gap 12     URINALYSIS, REFLEX TO URINE CULTURE - Abnormal    Specimen UA Urine, Unspecified      Color, UA Yellow      Appearance, UA Clear      pH, UA 7.0      Specific Gravity, UA 1.010      Protein, UA Negative      Glucose, UA Negative      Ketones, UA 1+ (*)     Bilirubin (UA) Negative      Occult Blood UA Negative      Nitrite, UA Negative      Urobilinogen, UA Negative      Leukocytes, UA Negative      Narrative:     Preferred Collection Type->Urine, Clean Catch  Specimen Source->Urine   LIPASE    Lipase 19     LACTIC ACID, PLASMA    Lactate (Lactic Acid) 0.8            Imaging Results              CT Abdomen Pelvis With IV Contrast NO Oral Contrast (Final result)  Result time 10/31/24 18:00:08      Final result by Kevan Elaine MD (10/31/24 18:00:08)                   Impression:      Redemonstration of colonic wall thickening and inflammatory change involving the sigmoid colon with multiple diverticula present suggesting acute diverticulitis.  No evidence of discrete drainable abscess or free intraperitoneal air.  Consider endoscopic follow-up when clinically appropriate.    Previously noted colonic wall thickening throughout the remaining colon appears somewhat improved from prior examination of 10/29/2024.  Liquid stool is noted within the rectum which may reflect superimposed diarrheal illness.    Additional stable findings as above.      Electronically signed by: Kevan Elaine MD  Date:    10/31/2024  Time:    18:00               Narrative:    EXAMINATION:  CT ABDOMEN PELVIS WITH IV CONTRAST    CLINICAL  HISTORY:  Abdominal pain, acute, nonlocalized;    TECHNIQUE:  Low dose axial images, sagittal and coronal reformations were obtained from the lung bases to the pubic symphysis following the IV administration of 75 mL of Omnipaque 350 .  Oral contrast was not given.    COMPARISON:  CT 10/29/2024    FINDINGS:  The lung bases are unremarkable.  There is no pleural fluid present.  The visualized portions of the heart appear normal.    The liver is enlarged.  No focal hepatic abnormality identified.  The gallbladder shows no evidence of stones or pericholecystic fluid.  There is no intra-or extrahepatic biliary ductal dilatation.    The stomach, spleen, pancreas, and adrenal glands are unremarkable.    The kidneys are normal in size and location and enhance symmetrically.  There is no evidence of hydronephrosis. Urinary bladder is relatively decompressed limiting assessment.  Uterus is not visualized and appears to be surgically absent.    The abdominal aorta is normal in course and caliber without significant atherosclerotic calcifications.    There is no evidence to suggest small bowel obstruction.  There is continued colonic wall thickening and inflammatory change involving the sigmoid colon with multiple diverticula present suggesting acute diverticulitis.  There is a small volume of free fluid within within the pelvis.  No discrete drainable abscess or free intraperitoneal air identified.  Previously demonstrated colonic wall thickening involving the remaining colon appears somewhat improved from prior examination.  There is liquid stool within the rectum which may reflect superimposed diarrheal illness.  No CT evidence of acute appendicitis.  There are scattered shotty small mesenteric and retroperitoneal lymph nodes.    Osseous structures demonstrate mild degenerative changes.  The extraperitoneal soft tissues are unremarkable.                                       Medications   HYDROmorphone injection 1 mg (1 mg  Intravenous Given 10/31/24 1546)   ondansetron injection 4 mg (4 mg Intravenous Given 10/31/24 1551)   iohexoL (OMNIPAQUE 350) injection 75 mL (75 mLs Intravenous Given 10/31/24 1727)   morphine injection 4 mg (4 mg Intravenous Given 10/31/24 1819)   ondansetron injection 4 mg (4 mg Intravenous Given 10/31/24 1820)     Medical Decision Making  Differential includes UTI, diverticulitis, colitis, bowel obstruction, ileus, ureteral calculus, appendicitis, etc.    Patient's lab work was unremarkable for the most part.  Mildly low potassium.  White blood cell count normal.  Awaiting CT results.  If CT is not resulted by the end of my shift, patient's care be transferred to the oncoming emergency physician.    Amount and/or Complexity of Data Reviewed  Labs: ordered.  Radiology: ordered.    Risk  Prescription drug management.                                      Clinical Impression:  Final diagnoses:  [K57.92] Diverticulitis (Primary)          ED Disposition Condition    Discharge Stable          ED Prescriptions       Medication Sig Dispense Start Date End Date Auth. Provider    oxyCODONE-acetaminophen (PERCOCET) 5-325 mg per tablet Take 1 tablet by mouth every 6 (six) hours as needed for Pain. 12 tablet 10/31/2024 -- Danny Wall MD          Follow-up Information       Follow up With Specialties Details Why Contact [facility]  Call in 1 day      [facility] Emergency Dept Emergency Medicine  As needed, If symptoms worsen [address] [facility] [address] [phone]             Danny Wall MD  11/01/24 0625

## 2024-10-31 NOTE — Clinical Note
"Alysia Cabreracharles Cruz was seen and treated in our emergency department on 10/31/2024.  She may return to work on 11/08/2024.  Pt. Was instructed from previous visit by staff to call ED if pt. Did not feel well enough to return to work. Pt. Encouraged to follow up with PCP for any additional days off.     If you have any questions or concerns, please don't hesitate to call.      ANIL Marcum RN    "

## 2024-10-31 NOTE — DISCHARGE INSTRUCTIONS
Your lab work was normal.  Your CT showed that the diverticulitis although improved, is still there.  Continue with the Cipro and Flagyl.  Take Percocet as needed for unrelieved pain.  Follow-up with doctors at [facility].  Return here as needed or if worse in any way.

## 2024-10-31 NOTE — Clinical Note
"Alysia Cabreracharles Cruz was seen and treated in our emergency department on 10/31/2024.  She may return to work on 11/06/2024.   Pt was instructed from previous visit by staff to call ed if pt did not feel well enough to return to work. Pt encouraged to follow up with PCP for any additional days off.      If you have any questions or concerns, please don't hesitate to call.      Manuel SEVILLA RN"

## 2024-11-02 LAB
BACTERIA BLD CULT: NORMAL

## 2024-11-04 LAB
BACTERIA BLD CULT: NORMAL
BACTERIA BLD CULT: NORMAL

## 2025-02-18 ENCOUNTER — HOSPITAL ENCOUNTER (INPATIENT)
Facility: HOSPITAL | Age: 50
LOS: 2 days | Discharge: HOME | DRG: 392 | End: 2025-02-21
Attending: EMERGENCY MEDICINE | Admitting: HOSPITALIST
Payer: COMMERCIAL

## 2025-02-18 DIAGNOSIS — R07.9 CHEST PAIN: ICD-10-CM

## 2025-02-18 DIAGNOSIS — K57.20 DIVERTICULITIS OF LARGE INTESTINE WITH ABSCESS WITHOUT BLEEDING: Primary | ICD-10-CM

## 2025-02-18 LAB
ALBUMIN SERPL BCP-MCNC: 3.9 G/DL (ref 3.5–5.2)
ALP SERPL-CCNC: 82 U/L (ref 40–150)
ALT SERPL W/O P-5'-P-CCNC: 5 U/L (ref 10–44)
ANION GAP SERPL CALC-SCNC: 10 MMOL/L (ref 8–16)
AST SERPL-CCNC: 10 U/L (ref 10–40)
BACTERIA #/AREA URNS HPF: NORMAL /HPF
BASOPHILS # BLD AUTO: 0.07 K/UL (ref 0–0.2)
BASOPHILS NFR BLD: 0.5 % (ref 0–1.9)
BILIRUB SERPL-MCNC: 1.1 MG/DL (ref 0.1–1)
BILIRUB UR QL STRIP: ABNORMAL
BUN SERPL-MCNC: 14 MG/DL (ref 6–20)
CALCIUM SERPL-MCNC: 9.8 MG/DL (ref 8.7–10.5)
CHLORIDE SERPL-SCNC: 104 MMOL/L (ref 95–110)
CLARITY UR: CLEAR
CO2 SERPL-SCNC: 25 MMOL/L (ref 23–29)
COLOR UR: YELLOW
CREAT SERPL-MCNC: 0.6 MG/DL (ref 0.5–1.4)
DIFFERENTIAL METHOD BLD: ABNORMAL
EOSINOPHIL # BLD AUTO: 0.2 K/UL (ref 0–0.5)
EOSINOPHIL NFR BLD: 1.8 % (ref 0–8)
ERYTHROCYTE [DISTWIDTH] IN BLOOD BY AUTOMATED COUNT: 12.4 % (ref 11.5–14.5)
EST. GFR  (NO RACE VARIABLE): >60 ML/MIN/1.73 M^2
ETHANOL SERPL-MCNC: <10 MG/DL (ref 0–10)
GLUCOSE SERPL-MCNC: 96 MG/DL (ref 70–110)
GLUCOSE UR QL STRIP: NEGATIVE
HCT VFR BLD AUTO: 38.8 % (ref 37–48.5)
HGB BLD-MCNC: 13.2 G/DL (ref 12–16)
HGB UR QL STRIP: ABNORMAL
HYALINE CASTS #/AREA URNS LPF: 0 /LPF
IMM GRANULOCYTES # BLD AUTO: 0.03 K/UL (ref 0–0.04)
IMM GRANULOCYTES NFR BLD AUTO: 0.2 % (ref 0–0.5)
KETONES UR QL STRIP: ABNORMAL
LEUKOCYTE ESTERASE UR QL STRIP: NEGATIVE
LIPASE SERPL-CCNC: 16 U/L (ref 4–60)
LYMPHOCYTES # BLD AUTO: 2 K/UL (ref 1–4.8)
LYMPHOCYTES NFR BLD: 15.2 % (ref 18–48)
MCH RBC QN AUTO: 31.4 PG (ref 27–31)
MCHC RBC AUTO-ENTMCNC: 34 G/DL (ref 32–36)
MCV RBC AUTO: 92 FL (ref 82–98)
MICROSCOPIC COMMENT: NORMAL
MONOCYTES # BLD AUTO: 1 K/UL (ref 0.3–1)
MONOCYTES NFR BLD: 7.7 % (ref 4–15)
NEUTROPHILS # BLD AUTO: 9.9 K/UL (ref 1.8–7.7)
NEUTROPHILS NFR BLD: 74.6 % (ref 38–73)
NITRITE UR QL STRIP: NEGATIVE
NRBC BLD-RTO: 0 /100 WBC
OTHER ELEMENTS URNS MICRO: NORMAL
PH UR STRIP: 6 [PH] (ref 5–8)
PLATELET # BLD AUTO: 254 K/UL (ref 150–450)
PMV BLD AUTO: 10.8 FL (ref 9.2–12.9)
POTASSIUM SERPL-SCNC: 3.7 MMOL/L (ref 3.5–5.1)
PROT SERPL-MCNC: 7.7 G/DL (ref 6–8.4)
PROT UR QL STRIP: ABNORMAL
RBC # BLD AUTO: 4.21 M/UL (ref 4–5.4)
RBC #/AREA URNS HPF: 3 /HPF (ref 0–4)
SODIUM SERPL-SCNC: 139 MMOL/L (ref 136–145)
SP GR UR STRIP: >=1.03 (ref 1–1.03)
URN SPEC COLLECT METH UR: ABNORMAL
UROBILINOGEN UR STRIP-ACNC: NEGATIVE EU/DL
WBC # BLD AUTO: 13.32 K/UL (ref 3.9–12.7)
WBC #/AREA URNS HPF: 1 /HPF (ref 0–5)

## 2025-02-18 PROCEDURE — 82077 ASSAY SPEC XCP UR&BREATH IA: CPT | Performed by: EMERGENCY MEDICINE

## 2025-02-18 PROCEDURE — 25000003 PHARM REV CODE 250: Performed by: EMERGENCY MEDICINE

## 2025-02-18 PROCEDURE — 96365 THER/PROPH/DIAG IV INF INIT: CPT

## 2025-02-18 PROCEDURE — 81000 URINALYSIS NONAUTO W/SCOPE: CPT | Performed by: EMERGENCY MEDICINE

## 2025-02-18 PROCEDURE — 63600175 PHARM REV CODE 636 W HCPCS: Performed by: EMERGENCY MEDICINE

## 2025-02-18 PROCEDURE — 25500020 PHARM REV CODE 255: Performed by: EMERGENCY MEDICINE

## 2025-02-18 PROCEDURE — 99285 EMERGENCY DEPT VISIT HI MDM: CPT | Mod: 25

## 2025-02-18 PROCEDURE — 96367 TX/PROPH/DG ADDL SEQ IV INF: CPT

## 2025-02-18 PROCEDURE — 74177 CT ABD & PELVIS W/CONTRAST: CPT | Mod: TC

## 2025-02-18 PROCEDURE — 80053 COMPREHEN METABOLIC PANEL: CPT | Performed by: EMERGENCY MEDICINE

## 2025-02-18 PROCEDURE — 85025 COMPLETE CBC W/AUTO DIFF WBC: CPT | Performed by: EMERGENCY MEDICINE

## 2025-02-18 PROCEDURE — 96375 TX/PRO/DX INJ NEW DRUG ADDON: CPT

## 2025-02-18 PROCEDURE — 74177 CT ABD & PELVIS W/CONTRAST: CPT | Mod: 26,,, | Performed by: RADIOLOGY

## 2025-02-18 PROCEDURE — 83690 ASSAY OF LIPASE: CPT | Performed by: EMERGENCY MEDICINE

## 2025-02-18 PROCEDURE — 96368 THER/DIAG CONCURRENT INF: CPT

## 2025-02-18 RX ORDER — ONDANSETRON HYDROCHLORIDE 2 MG/ML
4 INJECTION, SOLUTION INTRAVENOUS
Status: COMPLETED | OUTPATIENT
Start: 2025-02-18 | End: 2025-02-18

## 2025-02-18 RX ORDER — LEVOFLOXACIN 5 MG/ML
750 INJECTION, SOLUTION INTRAVENOUS
Status: COMPLETED | OUTPATIENT
Start: 2025-02-18 | End: 2025-02-19

## 2025-02-18 RX ORDER — MORPHINE SULFATE 2 MG/ML
4 INJECTION, SOLUTION INTRAMUSCULAR; INTRAVENOUS
Refills: 0 | Status: COMPLETED | OUTPATIENT
Start: 2025-02-18 | End: 2025-02-18

## 2025-02-18 RX ORDER — METRONIDAZOLE 500 MG/100ML
500 INJECTION, SOLUTION INTRAVENOUS
Status: COMPLETED | OUTPATIENT
Start: 2025-02-18 | End: 2025-02-19

## 2025-02-18 RX ADMIN — PIPERACILLIN SODIUM AND TAZOBACTAM SODIUM 4.5 G: 4; .5 INJECTION, POWDER, LYOPHILIZED, FOR SOLUTION INTRAVENOUS at 09:02

## 2025-02-18 RX ADMIN — LEVOFLOXACIN 750 MG: 750 INJECTION, SOLUTION INTRAVENOUS at 11:02

## 2025-02-18 RX ADMIN — MORPHINE SULFATE 4 MG: 2 INJECTION, SOLUTION INTRAMUSCULAR; INTRAVENOUS at 09:02

## 2025-02-18 RX ADMIN — IOHEXOL 75 ML: 350 INJECTION, SOLUTION INTRAVENOUS at 10:02

## 2025-02-18 RX ADMIN — ONDANSETRON 4 MG: 2 INJECTION INTRAMUSCULAR; INTRAVENOUS at 09:02

## 2025-02-18 RX ADMIN — METRONIDAZOLE 500 MG: 500 INJECTION, SOLUTION INTRAVENOUS at 11:02

## 2025-02-18 RX ADMIN — SODIUM CHLORIDE 500 ML: 9 INJECTION, SOLUTION INTRAVENOUS at 09:02

## 2025-02-18 NOTE — LETTER
[facility]  [address] [place] [address] February 21, 2025     Patient: Alysia Cruz   YOB: 1975   Date of Visit: 2/18/2025       To whom it may concern,    Alysia Fofana has been under my care since 02/18/2025.    She is clear to return to school/work on 02/25/2025.  She will at which point then can perform all duties without restriction.     If you have any questions or concerns, please don't hesitate to contact my office. Thank you for accommodating Alysia Fofana during this time of their treatment.        Sincerely,                CC  No Recipients

## 2025-02-19 ENCOUNTER — E-CONSULT (OUTPATIENT)
Dept: INTERVENTIONAL RADIOLOGY/VASCULAR | Facility: CLINIC | Age: 50
End: 2025-02-19
Payer: COMMERCIAL

## 2025-02-19 DIAGNOSIS — K57.20 DIVERTICULITIS OF LARGE INTESTINE WITH ABSCESS WITHOUT BLEEDING: Primary | ICD-10-CM

## 2025-02-19 PROBLEM — K57.80 DIVERTICULITIS OF INTESTINE WITH ABSCESS: Status: ACTIVE | Noted: 2025-02-19

## 2025-02-19 LAB
ALBUMIN SERPL BCP-MCNC: 3.3 G/DL (ref 3.5–5.2)
ALP SERPL-CCNC: 72 U/L (ref 40–150)
ALT SERPL W/O P-5'-P-CCNC: <5 U/L (ref 10–44)
ANION GAP SERPL CALC-SCNC: 12 MMOL/L (ref 8–16)
AST SERPL-CCNC: 9 U/L (ref 10–40)
BASOPHILS # BLD AUTO: 0.07 K/UL (ref 0–0.2)
BASOPHILS NFR BLD: 0.6 % (ref 0–1.9)
BILIRUB SERPL-MCNC: 1.5 MG/DL (ref 0.1–1)
BUN SERPL-MCNC: 11 MG/DL (ref 6–20)
CALCIUM SERPL-MCNC: 9 MG/DL (ref 8.7–10.5)
CHLORIDE SERPL-SCNC: 105 MMOL/L (ref 95–110)
CO2 SERPL-SCNC: 21 MMOL/L (ref 23–29)
CREAT SERPL-MCNC: 0.6 MG/DL (ref 0.5–1.4)
DIFFERENTIAL METHOD BLD: ABNORMAL
EOSINOPHIL # BLD AUTO: 0.2 K/UL (ref 0–0.5)
EOSINOPHIL NFR BLD: 2 % (ref 0–8)
ERYTHROCYTE [DISTWIDTH] IN BLOOD BY AUTOMATED COUNT: 12.4 % (ref 11.5–14.5)
EST. GFR  (NO RACE VARIABLE): >60 ML/MIN/1.73 M^2
GLUCOSE SERPL-MCNC: 82 MG/DL (ref 70–110)
HCT VFR BLD AUTO: 34.8 % (ref 37–48.5)
HGB BLD-MCNC: 11.9 G/DL (ref 12–16)
IMM GRANULOCYTES # BLD AUTO: 0.04 K/UL (ref 0–0.04)
IMM GRANULOCYTES NFR BLD AUTO: 0.4 % (ref 0–0.5)
LYMPHOCYTES # BLD AUTO: 1.4 K/UL (ref 1–4.8)
LYMPHOCYTES NFR BLD: 12.2 % (ref 18–48)
MAGNESIUM SERPL-MCNC: 1.6 MG/DL (ref 1.6–2.6)
MCH RBC QN AUTO: 31.6 PG (ref 27–31)
MCHC RBC AUTO-ENTMCNC: 34.2 G/DL (ref 32–36)
MCV RBC AUTO: 92 FL (ref 82–98)
MONOCYTES # BLD AUTO: 0.8 K/UL (ref 0.3–1)
MONOCYTES NFR BLD: 7.3 % (ref 4–15)
NEUTROPHILS # BLD AUTO: 8.7 K/UL (ref 1.8–7.7)
NEUTROPHILS NFR BLD: 77.5 % (ref 38–73)
NRBC BLD-RTO: 0 /100 WBC
PHOSPHATE SERPL-MCNC: 2.8 MG/DL (ref 2.7–4.5)
PLATELET # BLD AUTO: 235 K/UL (ref 150–450)
PMV BLD AUTO: 10.6 FL (ref 9.2–12.9)
POTASSIUM SERPL-SCNC: 3.4 MMOL/L (ref 3.5–5.1)
PROT SERPL-MCNC: 6.6 G/DL (ref 6–8.4)
RBC # BLD AUTO: 3.77 M/UL (ref 4–5.4)
SODIUM SERPL-SCNC: 138 MMOL/L (ref 136–145)
WBC # BLD AUTO: 11.27 K/UL (ref 3.9–12.7)

## 2025-02-19 PROCEDURE — 83735 ASSAY OF MAGNESIUM: CPT | Performed by: HOSPITALIST

## 2025-02-19 PROCEDURE — 25000003 PHARM REV CODE 250: Performed by: HOSPITALIST

## 2025-02-19 PROCEDURE — 85025 COMPLETE CBC W/AUTO DIFF WBC: CPT | Performed by: HOSPITALIST

## 2025-02-19 PROCEDURE — 27000207 HC ISOLATION

## 2025-02-19 PROCEDURE — 21400001 HC TELEMETRY ROOM

## 2025-02-19 PROCEDURE — 84100 ASSAY OF PHOSPHORUS: CPT | Performed by: HOSPITALIST

## 2025-02-19 PROCEDURE — 80053 COMPREHEN METABOLIC PANEL: CPT | Performed by: HOSPITALIST

## 2025-02-19 PROCEDURE — 63600175 PHARM REV CODE 636 W HCPCS: Mod: JZ,TB | Performed by: HOSPITALIST

## 2025-02-19 PROCEDURE — 99223 1ST HOSP IP/OBS HIGH 75: CPT | Mod: GT,,, | Performed by: HOSPITALIST

## 2025-02-19 PROCEDURE — 25000003 PHARM REV CODE 250: Performed by: INTERNAL MEDICINE

## 2025-02-19 RX ORDER — IPRATROPIUM BROMIDE AND ALBUTEROL SULFATE 2.5; .5 MG/3ML; MG/3ML
3 SOLUTION RESPIRATORY (INHALATION) EVERY 4 HOURS PRN
Status: DISCONTINUED | OUTPATIENT
Start: 2025-02-19 | End: 2025-02-19

## 2025-02-19 RX ORDER — NALOXONE HCL 0.4 MG/ML
0.02 VIAL (ML) INJECTION
Status: DISCONTINUED | OUTPATIENT
Start: 2025-02-19 | End: 2025-02-21 | Stop reason: HOSPADM

## 2025-02-19 RX ORDER — ENOXAPARIN SODIUM 100 MG/ML
40 INJECTION SUBCUTANEOUS EVERY 24 HOURS
Status: DISCONTINUED | OUTPATIENT
Start: 2025-02-19 | End: 2025-02-21 | Stop reason: HOSPADM

## 2025-02-19 RX ORDER — GLUCAGON 1 MG
1 KIT INJECTION
Status: DISCONTINUED | OUTPATIENT
Start: 2025-02-19 | End: 2025-02-21 | Stop reason: HOSPADM

## 2025-02-19 RX ORDER — SODIUM CHLORIDE 9 MG/ML
INJECTION, SOLUTION INTRAVENOUS CONTINUOUS
Status: DISCONTINUED | OUTPATIENT
Start: 2025-02-19 | End: 2025-02-20

## 2025-02-19 RX ORDER — HYDROMORPHONE HYDROCHLORIDE 1 MG/ML
1 INJECTION, SOLUTION INTRAMUSCULAR; INTRAVENOUS; SUBCUTANEOUS EVERY 4 HOURS PRN
Status: DISCONTINUED | OUTPATIENT
Start: 2025-02-19 | End: 2025-02-21 | Stop reason: HOSPADM

## 2025-02-19 RX ORDER — ONDANSETRON HYDROCHLORIDE 2 MG/ML
4 INJECTION, SOLUTION INTRAVENOUS EVERY 6 HOURS PRN
Status: DISCONTINUED | OUTPATIENT
Start: 2025-02-19 | End: 2025-02-21 | Stop reason: HOSPADM

## 2025-02-19 RX ORDER — OXYCODONE AND ACETAMINOPHEN 5; 325 MG/1; MG/1
1 TABLET ORAL EVERY 4 HOURS PRN
Refills: 0 | Status: DISCONTINUED | OUTPATIENT
Start: 2025-02-19 | End: 2025-02-21 | Stop reason: HOSPADM

## 2025-02-19 RX ORDER — IBUPROFEN 200 MG
24 TABLET ORAL
Status: DISCONTINUED | OUTPATIENT
Start: 2025-02-19 | End: 2025-02-21 | Stop reason: HOSPADM

## 2025-02-19 RX ORDER — IBUPROFEN 200 MG
16 TABLET ORAL
Status: DISCONTINUED | OUTPATIENT
Start: 2025-02-19 | End: 2025-02-21 | Stop reason: HOSPADM

## 2025-02-19 RX ORDER — CHLORHEXIDINE GLUCONATE ORAL RINSE 1.2 MG/ML
15 SOLUTION DENTAL 2 TIMES DAILY
COMMUNITY
Start: 2025-02-12

## 2025-02-19 RX ORDER — SODIUM CHLORIDE 0.9 % (FLUSH) 0.9 %
10 SYRINGE (ML) INJECTION EVERY 12 HOURS PRN
Status: DISCONTINUED | OUTPATIENT
Start: 2025-02-19 | End: 2025-02-21 | Stop reason: HOSPADM

## 2025-02-19 RX ORDER — ACETAMINOPHEN 325 MG/1
650 TABLET ORAL EVERY 4 HOURS PRN
Status: DISCONTINUED | OUTPATIENT
Start: 2025-02-19 | End: 2025-02-21 | Stop reason: HOSPADM

## 2025-02-19 RX ADMIN — PIPERACILLIN SODIUM AND TAZOBACTAM SODIUM 4.5 G: 4; .5 INJECTION, POWDER, LYOPHILIZED, FOR SOLUTION INTRAVENOUS at 03:02

## 2025-02-19 RX ADMIN — PIPERACILLIN SODIUM AND TAZOBACTAM SODIUM 4.5 G: 4; .5 INJECTION, POWDER, LYOPHILIZED, FOR SOLUTION INTRAVENOUS at 06:02

## 2025-02-19 RX ADMIN — OXYCODONE HYDROCHLORIDE AND ACETAMINOPHEN 1 TABLET: 5; 325 TABLET ORAL at 03:02

## 2025-02-19 RX ADMIN — ONDANSETRON 4 MG: 2 INJECTION INTRAMUSCULAR; INTRAVENOUS at 08:02

## 2025-02-19 RX ADMIN — HYDROMORPHONE HYDROCHLORIDE 1 MG: 1 INJECTION, SOLUTION INTRAMUSCULAR; INTRAVENOUS; SUBCUTANEOUS at 06:02

## 2025-02-19 RX ADMIN — PIPERACILLIN SODIUM AND TAZOBACTAM SODIUM 4.5 G: 4; .5 INJECTION, POWDER, LYOPHILIZED, FOR SOLUTION INTRAVENOUS at 10:02

## 2025-02-19 RX ADMIN — ONDANSETRON 4 MG: 2 INJECTION INTRAMUSCULAR; INTRAVENOUS at 05:02

## 2025-02-19 RX ADMIN — SODIUM CHLORIDE: 9 INJECTION, SOLUTION INTRAVENOUS at 03:02

## 2025-02-19 RX ADMIN — HYDROMORPHONE HYDROCHLORIDE 1 MG: 1 INJECTION, SOLUTION INTRAMUSCULAR; INTRAVENOUS; SUBCUTANEOUS at 02:02

## 2025-02-19 RX ADMIN — SODIUM CHLORIDE: 9 INJECTION, SOLUTION INTRAVENOUS at 01:02

## 2025-02-19 RX ADMIN — HYDROMORPHONE HYDROCHLORIDE 1 MG: 1 INJECTION, SOLUTION INTRAMUSCULAR; INTRAVENOUS; SUBCUTANEOUS at 11:02

## 2025-02-19 RX ADMIN — OXYCODONE HYDROCHLORIDE AND ACETAMINOPHEN 1 TABLET: 5; 325 TABLET ORAL at 07:02

## 2025-02-19 RX ADMIN — ENOXAPARIN SODIUM 40 MG: 40 INJECTION SUBCUTANEOUS at 05:02

## 2025-02-19 NOTE — HPI
The patient is a 50 y/o female with PMH of recurrent episodes of diverticulitis who presents with lower abdominal pain. Pain started a week ago and she was on a course of amoxicillin. She has not had any relief from the pain and also reports nausea. Work up in the ER shows diverticulitis with a sigmoid abscess. ER physician discussed the case with IR but IR felt that the abscess is intramural and small and would not be amenable to drainage at this time. Patient is being admitted for diverticulitis management. She denies any vomiting or fevers. Patient was seen in October 2024 twice for same.

## 2025-02-19 NOTE — PLAN OF CARE
[facility] Care Unit  Initial Discharge Assessment       Primary Care Provider: No, Primary Doctor    Admission Diagnosis: Chest pain [R07.9]  Diverticulitis of large intestine with abscess without bleeding [K57.20]    Admission Date: 2/18/2025  Expected Discharge Date: 2/21/2025    DC assessment completed with patient at bedside. Verified information on facesheet as correct. Pt lives at listed address with parents and sons. Reports she has help if needed from parents.  No PCP request f/u with [facility] provider.  Pharmacy is [facility]. Denies hh/hd/outpt services or DME.... Reports being independent with activities. Drives herself to apts. Reports a family member will provide transportation home upon DC. Reports no home medications, but can currently afford meds if needed.  Verified insurance on file. Denies recent inpt stay in last 30 days.  DC plan is. Home with family.      Transition of Care Barriers: None    Payor: [employer] / Plan: [facility] [employer] [place] / Product Type: Commercial /     Extended Emergency Contact Information  Primary Emergency Contact: Martina Cruz  Mobile Phone: [phone]  Relation: Mother  Preferred language: English   needed? No    Discharge Plan A: Home with family  Discharge Plan B: Home      [facility] - [place], [place] - [address]  [address] [place] [address] [place] [address]  Phone: [phone] Fax: [phone]    [id number] [place], [place] - [address]  [address]  [place] [address]  Phone: [phone] Fax: [phone]      Initial Assessment (most recent)       Adult Discharge Assessment - 02/19/25 1020          Discharge Assessment    Assessment Type Discharge Planning Assessment     Confirmed/corrected address, phone number and insurance Yes     Confirmed Demographics Correct on Facesheet     Source of Information patient     When was your last doctors  appointment? --   no PCP    Communicated RADHA with patient/caregiver Yes     Reason For Admission diverticulitis     People in Home child(stu), adult;parent(s)     Do you expect to return to your current living situation? Yes     Do you have help at home or someone to help you manage your care at home? Yes     Who are your caregiver(s) and their phone number(s)? mother Martina arriaza [phone]     Prior to hospitilization cognitive status: Alert/Oriented;No Deficits     Current cognitive status: Alert/Oriented;No Deficits     Walking or Climbing Stairs Difficulty no     Dressing/Bathing Difficulty no     Home Accessibility not wheelchair accessible     Home Layout Able to live on 1st floor     Equipment Currently Used at Home none     Readmission within 30 days? No     Patient currently being followed by outpatient case management? No     Do you currently have service(s) that help you manage your care at home? No     Do you take prescription medications? No     Do you have prescription coverage? Yes     Coverage [employer]     Do you have any problems affording any of your prescribed medications? No     Is the patient taking medications as prescribed? --   no meds    Who is going to help you get home at discharge? a family member     How do you get to doctors appointments? car, drives self     Are you on dialysis? No     Do you take coumadin? No     Discharge Plan A Home with family     Discharge Plan B Home     DME Needed Upon Discharge  none     Discharge Plan discussed with: Patient     Transition of Care Barriers None

## 2025-02-19 NOTE — PLAN OF CARE
Problem: Adult Inpatient Plan of Care  Goal: Plan of Care Review  Outcome: Progressing  Goal: Patient-Specific Goal (Individualized)  Outcome: Progressing  Goal: Absence of Hospital-Acquired Illness or Injury  Outcome: Progressing  Goal: Optimal Comfort and Wellbeing  Outcome: Progressing  Goal: Readiness for Transition of Care  Outcome: Progressing  Purposeful rounding complete. All questions answered. Call light within reach. Bed in locked and low position.

## 2025-02-19 NOTE — H&P
[facility]  History & Physical    Patient Name: Alysia Cruz  MRN: [id number]  Admission Date: 2/18/2025  Attending Physician: Mukund Castaneda DO   Primary Care Provider: Brittney, Primary Doctor         Patient information was obtained from patient and ER records.       Subjective:     Principal Problem:Diverticulitis of intestine with abscess    Chief Complaint:   Chief Complaint   Patient presents with    Abdominal Pain     Pt reports intermittent lower abdominal pain to either side for over 1 week.  Reports a hx of diverticulitis.  Reports finishing amoxicillin rx 3 days ago.    Nausea    Constipation        HPI: The patient is a 48 y/o female with PMH of recurrent episodes of diverticulitis who presents with lower abdominal pain. Pain started a week ago and she was on a course of amoxicillin. She has not had any relief from the pain and also reports nausea. Work up in the ER shows diverticulitis with a sigmoid abscess. ER physician discussed the case with IR but IR felt that the abscess is intramural and small and would not be amenable to drainage at this time. Patient is being admitted for diverticulitis management. She denies any vomiting or fevers. Patient was seen in October 2024 twice for same.     No past medical history on file.    Past Surgical History:   Procedure Laterality Date    HYSTERECTOMY         Review of patient's allergies indicates:   Allergen Reactions    Sulfa (sulfonamide antibiotics) Rash       No current facility-administered medications on file prior to encounter.     Current Outpatient Medications on File Prior to Encounter   Medication Sig    chlorhexidine (PERIDEX) 0.12 % solution 15 mLs 2 (two) times daily.    HYDROcodone-acetaminophen (NORCO) 7.5-325 mg per tablet Take 1 tablet by mouth every 4 (four) hours as needed for Pain. (Patient not taking: Reported on 3/2/2020)    ondansetron (ZOFRAN-ODT) 4 MG TbDL Take 1 tablet (4 mg total) by mouth every 6  (six) hours as needed (nausea).    oxyCODONE-acetaminophen (PERCOCET) 5-325 mg per tablet Take 1 tablet by mouth every 6 (six) hours as needed for Pain.     Family History       Problem Relation (Age of Onset)    Arthritis Mother, Father, Sister    Cancer Mother    Diabetes Brother    Hypertension Mother, Father, Sister, Brother    Lupus Mother    Seizures Father          Tobacco Use    Smoking status: Every Day     Types: Cigarettes    Smokeless tobacco: Never   Substance and Sexual Activity    Alcohol use: Yes    Drug use: Yes     Types: Marijuana    Sexual activity: Not on file     Review of Systems   Constitutional:  Positive for activity change and appetite change. Negative for fever.   Gastrointestinal:  Positive for abdominal pain. Negative for nausea and vomiting.     Objective:     Vital Signs (Most Recent):  Temp: 98.4 °F (36.9 °C) (02/18/25 2041)  Pulse: 99 (02/18/25 2041)  Resp: 20 (02/18/25 2117)  BP: (!) 152/72 (02/18/25 2041)  SpO2: 99 % (02/18/25 2041) Vital Signs (24h Range):  Temp:  [98.4 °F (36.9 °C)] 98.4 °F (36.9 °C)  Pulse:  [99] 99  Resp:  [18-20] 20  SpO2:  [99 %] 99 %  BP: (152)/(72) 152/72     Weight: 49.9 kg (110 lb)  Body mass index is 20.12 kg/m².     Physical Exam  Constitutional:       Comments: Appears in mild discomfort from pain    Cardiovascular:      Rate and Rhythm: Normal rate.   Pulmonary:      Effort: Pulmonary effort is normal. No respiratory distress.   Abdominal:      Comments: TTP over lower abdomen on patient palpation    Neurological:      Mental Status: She is alert and oriented to person, place, and time.                Significant Labs: All pertinent labs within the past 24 hours have been reviewed.    Significant Imaging: I have reviewed all pertinent imaging results/findings within the past 24 hours.  Assessment/Plan:     * Diverticulitis of intestine with abscess  CT showed: Continued wall thickening of the sigmoid colon, suspicious for acute, sigmoid  diverticulitis.  Associated rim enhancing fluid collection within the wall of the sigmoid colon is concerning for an abscess.  A neoplastic process is not excluded, and continued close interval follow-up is recommended, with consideration for direct visualization and tissue sampling. There is a rim enhancing fluid collection within the wall of the sigmoid colon measuring approximately 2.9 x 2.6 cm compatible with an abscess.     Per IR recommendations, patient will need repeat CT in 5 days  Continue zosyn, IVF, pain control, and antiemetics            VTE Risk Mitigation (From admission, onward)           Ordered     enoxaparin injection 40 mg  Daily         02/19/25 0150                         The attending portion of this evaluation, treatment, and documentation was performed per Hieu Sanderson MD via Telemedicine AudioVisual using the secure [employer] software platform with 2 way audio/video. The provider was located off-site and the patient is located in the hospital. The aforementioned video software was utilized to document the relevant history and physical exam            Hieu Sanderson MD  Department of Hospital Medicine   [facility] Emergency Dept

## 2025-02-19 NOTE — CONSULTS
[facility] 6th [facility]  Response for E-Consult     Patient Name: Alysia Cruz  MRN: [id number]  Primary Care Provider: Brittney, Primary Doctor   Requesting Provider: Mukund Castaneda, *  Consults    Recommendation: Antibiotics and short term follow up with repeat CT in 5 days.    Received call to evaluate for possible abscess drainage in the sigmoid colon. CT shows small intramural air and fluid collection c/f abscess. Given intramural location, small size, and tight window, recommend treating with antibiotics and short term follow up with repeat CT in 5 days to assess progression. If pt demonstrates improvement, no need for drainage. If abscess enlarges or extends beyond the confines of the wall of the sigmoid colon, as long as there's adequate window, aspiration or drainage could be considered. If the pt's condition worsens, consider repeating CT scan sooner to evaluate abscess. Findings and recommendations were discussed via telephone with ED physician.        Additional future steps to consider: If the patient's condition worsens, consider repeating CT sooner to evaluate for change in size of intramural abscess    Total time of Consultation: 15 minute    I did speak to the requesting provider verbally about this.     *This eConsult is based on the clinical data available to me and is furnished without benefit of a physical examination. The eConsult will need to be interpreted in light of any clinical issues or changes in patient status not available to me at the time of filing this eConsults. Significant changes in patient condition or level of acuity should result in immediate formal consultation and reevaluation. Please alert me if you have further questions.    Thank you for this eConsult referral.     MD [facility]

## 2025-02-19 NOTE — CARE UPDATE
Chief complaint abdominal pain    49-year-old female with recurrent episodes of diverticulitis started developing abdominal pain a week ago treated with amoxicillin patient underwent CT scan that showed diverticulitis with sigmoid abscess iron felt the abscess was too small the need draining.  So patient is being admitted for antibiotic therapy patient states she is having a lot of pain issues.  Has been started on Dilaudid states that is not covering the pain for the duration we discussed possible using p.o. medications risk of using Dilaudid labs evaluated showed the white cells 11.27, hemoglobin 11.19 platelets of 235 sodium 138 potassium 3.4 CO2 of 21 anion gap of 12 BUN of 11 CT scan showedContinued wall thickening of the sigmoid colon, suspicious for acute, sigmoid diverticulitis. Associated rim enhancing fluid collection within the wall of the sigmoid colon is concerning for an abscess. A neoplastic process is not excluded, and continued close interval follow-up is recommended, with consideration for direct visualization and tissue sampling.     So like patient has had recurrent episodes of the this.  Still not had a colonoscopy.  Which she needed we will have performed.  Certainly pain is clinically out of proportion for what she is had going on compared to CT scan.  We will try to switch her over to an oral narcotic.  And get away from the Dilaudid.  We will continue with the Zosyn.  I expect that this will get better soon so we will follow labs.  She will need to follow up with General surgery at some point in time./GI for endoscopy.  We will switch over to oxycodone.  As we treat her diverticulitis and repeat CT scan.  Appropriate antibiotics from an outpatient standpoint we would like bleed be a Cipro and Flagyl

## 2025-02-19 NOTE — ED TRIAGE NOTES
Pt reports intermittent lower abdominal pain to either side for over 1 week.  Reports a hx of diverticulitis.  Reports finishing amoxicillin rx 3 days ago.

## 2025-02-19 NOTE — ED PROVIDER NOTES
Encounter Date: 2/18/2025       History     Chief Complaint   Patient presents with    Abdominal Pain     Pt reports intermittent lower abdominal pain to either side for over 1 week.  Reports a hx of diverticulitis.  Reports finishing amoxicillin rx 3 days ago.    Nausea    Constipation     Patient presents to the emergency department for evaluation of abdominal pain.  Patient states she has been having intermittent abdominal pain for the last 10-14 days.  She states her pain has gotten significantly worse over the last 24 hours.  She has had some nausea, but no vomiting.  She thinks she has had a fever at home but has not checked her temperature.  She denies any diarrhea.  Patient has had no hematemesis or hematochezia.  She states she does have a history of diverticulitis.  She has not been seen for this complaint in the past 2 weeks until tonight.  She denies any other complaints.    The history is provided by the patient.     Review of patient's allergies indicates:   Allergen Reactions    Sulfa (sulfonamide antibiotics) Rash     No past medical history on file.  Past Surgical History:   Procedure Laterality Date    HYSTERECTOMY       Family History   Problem Relation Name Age of Onset    Hypertension Mother      Lupus Mother      Arthritis Mother      Cancer Mother      Hypertension Father      Arthritis Father      Seizures Father      Hypertension Sister      Arthritis Sister      Diabetes Brother      Hypertension Brother       Social History[1]  Review of Systems   Constitutional:  Positive for fever. Negative for activity change, appetite change and chills.   HENT:  Negative for congestion, ear discharge, ear pain, nosebleeds, sore throat and voice change.    Eyes:  Negative for photophobia, pain and discharge.   Respiratory:  Negative for cough, chest tightness, shortness of breath and wheezing.    Cardiovascular:  Negative for chest pain and palpitations.   Gastrointestinal:  Positive for abdominal pain  and nausea. Negative for constipation and vomiting.   Genitourinary:  Negative for dysuria, flank pain, frequency and urgency.   Musculoskeletal:  Negative for back pain and neck pain.   Skin:  Negative for rash and wound.   Neurological:  Negative for dizziness, seizures, weakness and headaches.   All other systems reviewed and are negative.      Physical Exam     Initial Vitals [02/18/25 2041]   BP Pulse Resp Temp SpO2   (!) 152/72 99 18 98.4 °F (36.9 °C) 99 %      MAP       --         Physical Exam    Nursing note and vitals reviewed.  Constitutional: She appears well-developed and well-nourished.   HENT:   Head: Normocephalic and atraumatic.   Right Ear: External ear normal.   Left Ear: External ear normal.   Nose: Nose normal. Mouth/Throat: Oropharynx is clear and moist.   Eyes: Conjunctivae and EOM are normal. Pupils are equal, round, and reactive to light.   Neck: Neck supple. No tracheal deviation present.   Normal range of motion.  Cardiovascular:  Normal rate, regular rhythm and normal heart sounds.           Pulmonary/Chest: Breath sounds normal. She has no wheezes.   Abdominal: Abdomen is soft. Bowel sounds are normal. There is abdominal tenderness.   Patient has a generalized abdominal tenderness with guarding.  Bowel sounds are diminished throughout the abdomen. There is guarding.   Musculoskeletal:         General: No tenderness. Normal range of motion.      Cervical back: Normal range of motion and neck supple.     Neurological: She is alert and oriented to person, place, and time. She has normal reflexes.   Skin: Skin is warm and dry. Capillary refill takes less than 2 seconds. No rash noted.         ED Course   Procedures  Labs Reviewed   CBC W/ AUTO DIFFERENTIAL - Abnormal       Result Value    WBC 13.32 (*)     RBC 4.21      Hemoglobin 13.2      Hematocrit 38.8      MCV 92      MCH 31.4 (*)     MCHC 34.0      RDW 12.4      Platelets 254      MPV 10.8      Immature Granulocytes 0.2      Gran #  (ANC) 9.9 (*)     Immature Grans (Abs) 0.03      Lymph # 2.0      Mono # 1.0      Eos # 0.2      Baso # 0.07      nRBC 0      Gran % 74.6 (*)     Lymph % 15.2 (*)     Mono % 7.7      Eosinophil % 1.8      Basophil % 0.5      Differential Method Automated     COMPREHENSIVE METABOLIC PANEL - Abnormal    Sodium 139      Potassium 3.7      Chloride 104      CO2 25      Glucose 96      BUN 14      Creatinine 0.6      Calcium 9.8      Total Protein 7.7      Albumin 3.9      Total Bilirubin 1.1 (*)     Alkaline Phosphatase 82      AST 10      ALT 5 (*)     eGFR >60.0      Anion Gap 10     URINALYSIS, REFLEX TO URINE CULTURE - Abnormal    Specimen UA Urine, Unspecified      Color, UA Yellow      Appearance, UA Clear      pH, UA 6.0      Specific Gravity, UA >=1.030 (*)     Protein, UA 1+ (*)     Glucose, UA Negative      Ketones, UA 3+ (*)     Bilirubin (UA) 1+ (*)     Occult Blood UA 1+ (*)     Nitrite, UA Negative      Urobilinogen, UA Negative      Leukocytes, UA Negative      Narrative:     Preferred Collection Type->Urine, Clean Catch  Specimen Source->Urine   ALCOHOL,MEDICAL (ETHANOL)    Alcohol, Serum <10     LIPASE    Lipase 16     URINALYSIS MICROSCOPIC    RBC, UA 3      WBC, UA 1      Bacteria Occasional      Hyaline Casts, UA 0      Other (U/A) Few mucus      Microscopic Comment SEE COMMENT      Narrative:     Preferred Collection Type->Urine, Clean Catch  Specimen Source->Urine          Imaging Results              CT Abdomen Pelvis With IV Contrast NO Oral Contrast (Final result)  Result time 02/18/25 23:16:49      Final result by Wallace Leon DO (02/18/25 23:16:49)                   Impression:      Continued wall thickening of the sigmoid colon, suspicious for acute, sigmoid diverticulitis.  Associated rim enhancing fluid collection within the wall of the sigmoid colon is concerning for an abscess.  A neoplastic process is not excluded, and continued close interval follow-up is recommended, with  consideration for direct visualization and tissue sampling.      Electronically signed by: Wallace Leon  Date:    02/18/2025  Time:    23:16               Narrative:    EXAMINATION:  CT ABDOMEN PELVIS WITH IV CONTRAST    CLINICAL HISTORY:  Abdominal pain, acute, nonlocalized;    TECHNIQUE:  Axial CT images with sagittal and coronal reformats were obtained of the abdomen and pelvis from the hemidiaphragms through the symphysis pubis after the administration of 75mL Omnipaque 350.    COMPARISON:  CT of the abdomen and pelvis from 10/31/2024.    FINDINGS:  Lung Bases: Clear.    Heart: Heart size is normal.  No pericardial effusion.    Liver: The liver is enlarged.  There are no focal hepatic lesions.  The portal vasculature is patent.    Biliary tract: No intrahepatic or extrahepatic biliary ductal dilatation.    Gallbladder: No radiodense gallstone. No wall thickening or pericholecystic fluid.    Pancreas: Normal. No pancreatic ductal dilatation.    Spleen: Normal size without focal lesion.    Adrenals: Unremarkable.    Kidneys and urinary collecting systems: Normal.  No hydronephrosis or urolithiasis.    Lymph nodes: None enlarged.    Stomach and bowel: The stomach is normal.  Again seen is wall thickening and stranding of the sigmoid colon, compatible with diverticulitis.  There is a rim enhancing fluid collection within the wall of the sigmoid colon measuring approximately 2.9 x 2.6 cm (series 2, image 66), compatible with an abscess.  There is no bowel obstruction.    Peritoneum and mesentery: No ascites or free intraperitoneal air.  No abdominal fluid collection.    Vasculature: No aneurysm or significant atherosclerosis.    Urinary bladder: No wall thickening.    Reproductive organs: The uterus is absent.    Body wall: No abnormality.    Musculoskeletal: No aggressive osseous lesion.                                       Medications   levoFLOXacin 750 mg/150 mL IVPB 750 mg (750 mg Intravenous New Bag 2/18/25  2341)   metronidazole IVPB 500 mg (500 mg Intravenous New Bag 2/18/25 2344)   sodium chloride 0.9% bolus 500 mL 500 mL (0 mLs Intravenous Stopped 2/18/25 2157)   piperacillin-tazobactam (ZOSYN) 4.5 g in D5W 100 mL IVPB (MB+) (0 g Intravenous Stopped 2/18/25 2157)   morphine injection 4 mg (4 mg Intravenous Given 2/18/25 2117)   ondansetron injection 4 mg (4 mg Intravenous Given 2/18/25 2117)   iohexoL (OMNIPAQUE 350) injection 75 mL (75 mLs Intravenous Given 2/18/25 2204)     Medical Decision Making  History is obtained from the patient.  All labs and x-rays are reviewed by myself.  Differential diagnosis includes, but is not limited to, diverticulitis/gastritis/ruptured hollow viscus/cholelithiasis/cholecystitis/bowel obstruction  Social determinants of healthcare were considered.  Patient has commercial insurance, but does not have a primary care provider.  This may be a determined to her access to healthcare.    Patient has peritoneal signs at time of evaluation.  Suspect ruptured hollow viscus.  We will give the patient antibiotics and pain medicine now, we will start her on IV fluids and evaluate for same.      Lab work reveals mildly elevated white blood cell count at 13.5.  Remainder of the labs within normal limits.  CT of the abdomen shows diverticulitis with intra-abdominal abscess.  We will pace patient in transfer portal for interventional radiology for possible drainage of intra-abdominal abscess.  Patient started on antibiotics in the emergency department.    I spoke to Interventional Radiology who reviewed the films and stated that the abscess is intramural and small and would not lend itself to being drained by IR.  They recommended IV antibiotics and a repeat CT in 5 days.  I discussed the case with hospitalist on-call who agrees to admit the patient at this time for further evaluation and treatment.    Amount and/or Complexity of Data Reviewed  Labs: ordered.  Radiology:  ordered.    Risk  Prescription drug management.  Decision regarding hospitalization.                                      Clinical Impression:  Final diagnoses:  [K57.20] Diverticulitis of large intestine with abscess without bleeding (Primary)          ED Disposition Condition    Admit Stable                    [1]   Social History  Tobacco Use    Smoking status: Every Day     Types: Cigarettes    Smokeless tobacco: Never   Substance Use Topics    Alcohol use: Yes    Drug use: Yes     Types: Marijuana        Mukund Castaneda,   02/19/25 0014

## 2025-02-19 NOTE — SUBJECTIVE & OBJECTIVE
No past medical history on file.    Past Surgical History:   Procedure Laterality Date    HYSTERECTOMY         Review of patient's allergies indicates:   Allergen Reactions    Sulfa (sulfonamide antibiotics) Rash       No current facility-administered medications on file prior to encounter.     Current Outpatient Medications on File Prior to Encounter   Medication Sig    chlorhexidine (PERIDEX) 0.12 % solution 15 mLs 2 (two) times daily.    HYDROcodone-acetaminophen (NORCO) 7.5-325 mg per tablet Take 1 tablet by mouth every 4 (four) hours as needed for Pain. (Patient not taking: Reported on 3/2/2020)    ondansetron (ZOFRAN-ODT) 4 MG TbDL Take 1 tablet (4 mg total) by mouth every 6 (six) hours as needed (nausea).    oxyCODONE-acetaminophen (PERCOCET) 5-325 mg per tablet Take 1 tablet by mouth every 6 (six) hours as needed for Pain.     Family History       Problem Relation (Age of Onset)    Arthritis Mother, Father, Sister    Cancer Mother    Diabetes Brother    Hypertension Mother, Father, Sister, Brother    Lupus Mother    Seizures Father          Tobacco Use    Smoking status: Every Day     Types: Cigarettes    Smokeless tobacco: Never   Substance and Sexual Activity    Alcohol use: Yes    Drug use: Yes     Types: Marijuana    Sexual activity: Not on file     Review of Systems   Constitutional:  Positive for activity change and appetite change. Negative for fever.   Gastrointestinal:  Positive for abdominal pain. Negative for nausea and vomiting.     Objective:     Vital Signs (Most Recent):  Temp: 98.4 °F (36.9 °C) (02/18/25 2041)  Pulse: 99 (02/18/25 2041)  Resp: 20 (02/18/25 2117)  BP: (!) 152/72 (02/18/25 2041)  SpO2: 99 % (02/18/25 2041) Vital Signs (24h Range):  Temp:  [98.4 °F (36.9 °C)] 98.4 °F (36.9 °C)  Pulse:  [99] 99  Resp:  [18-20] 20  SpO2:  [99 %] 99 %  BP: (152)/(72) 152/72     Weight: 49.9 kg (110 lb)  Body mass index is 20.12 kg/m².     Physical Exam  Constitutional:       Comments: Appears in  mild discomfort from pain    Cardiovascular:      Rate and Rhythm: Normal rate.   Pulmonary:      Effort: Pulmonary effort is normal. No respiratory distress.   Abdominal:      Comments: TTP over lower abdomen on patient palpation    Neurological:      Mental Status: She is alert and oriented to person, place, and time.                Significant Labs: All pertinent labs within the past 24 hours have been reviewed.    Significant Imaging: I have reviewed all pertinent imaging results/findings within the past 24 hours.

## 2025-02-19 NOTE — ASSESSMENT & PLAN NOTE
CT showed: Continued wall thickening of the sigmoid colon, suspicious for acute, sigmoid diverticulitis.  Associated rim enhancing fluid collection within the wall of the sigmoid colon is concerning for an abscess.  A neoplastic process is not excluded, and continued close interval follow-up is recommended, with consideration for direct visualization and tissue sampling. There is a rim enhancing fluid collection within the wall of the sigmoid colon measuring approximately 2.9 x 2.6 cm compatible with an abscess.     Per IR recommendations, patient will need repeat CT in 5 days  Continue zosyn, IVF, pain control, and antiemetics

## 2025-02-19 NOTE — PLAN OF CARE
Problem: Adult Inpatient Plan of Care  Goal: Plan of Care Review  2/19/2025 1619 by Madelyn Dao RN  Outcome: Progressing  2/19/2025 1619 by Madelyn Dao RN  Outcome: Progressing  Goal: Patient-Specific Goal (Individualized)  2/19/2025 1619 by Madelyn Dao RN  Outcome: Progressing  2/19/2025 1619 by Madelyn Dao RN  Outcome: Progressing  Goal: Absence of Hospital-Acquired Illness or Injury  2/19/2025 1619 by Madelyn Dao RN  Outcome: Progressing  2/19/2025 1619 by Madelyn Dao RN  Outcome: Progressing  Goal: Optimal Comfort and Wellbeing  2/19/2025 1619 by Madelyn Dao RN  Outcome: Progressing  2/19/2025 1619 by Madelyn Dao RN  Outcome: Progressing  Goal: Readiness for Transition of Care  2/19/2025 1619 by Madelyn Dao RN  Outcome: Progressing  2/19/2025 1619 by Madelyn Dao RN  Outcome: Progressing

## 2025-02-20 LAB
ALBUMIN SERPL BCP-MCNC: 3 G/DL (ref 3.5–5.2)
ALP SERPL-CCNC: 73 U/L (ref 40–150)
ALT SERPL W/O P-5'-P-CCNC: <5 U/L (ref 10–44)
ANION GAP SERPL CALC-SCNC: 15 MMOL/L (ref 8–16)
ANION GAP SERPL CALC-SCNC: 8 MMOL/L (ref 8–16)
AST SERPL-CCNC: 10 U/L (ref 10–40)
BASOPHILS # BLD AUTO: 0.04 K/UL (ref 0–0.2)
BASOPHILS # BLD AUTO: 0.05 K/UL (ref 0–0.2)
BASOPHILS NFR BLD: 0.5 % (ref 0–1.9)
BASOPHILS NFR BLD: 0.7 % (ref 0–1.9)
BILIRUB SERPL-MCNC: 1.3 MG/DL (ref 0.1–1)
BUN SERPL-MCNC: 11 MG/DL (ref 6–20)
BUN SERPL-MCNC: 11 MG/DL (ref 6–20)
CALCIUM SERPL-MCNC: 8.8 MG/DL (ref 8.7–10.5)
CALCIUM SERPL-MCNC: 8.8 MG/DL (ref 8.7–10.5)
CHLORIDE SERPL-SCNC: 109 MMOL/L (ref 95–110)
CHLORIDE SERPL-SCNC: 111 MMOL/L (ref 95–110)
CO2 SERPL-SCNC: 10 MMOL/L (ref 23–29)
CO2 SERPL-SCNC: 18 MMOL/L (ref 23–29)
CREAT SERPL-MCNC: 0.7 MG/DL (ref 0.5–1.4)
CREAT SERPL-MCNC: 0.7 MG/DL (ref 0.5–1.4)
DIFFERENTIAL METHOD BLD: ABNORMAL
DIFFERENTIAL METHOD BLD: ABNORMAL
EOSINOPHIL # BLD AUTO: 0 K/UL (ref 0–0.5)
EOSINOPHIL # BLD AUTO: 0.1 K/UL (ref 0–0.5)
EOSINOPHIL NFR BLD: 0.5 % (ref 0–8)
EOSINOPHIL NFR BLD: 1.6 % (ref 0–8)
ERYTHROCYTE [DISTWIDTH] IN BLOOD BY AUTOMATED COUNT: 11.9 % (ref 11.5–14.5)
ERYTHROCYTE [DISTWIDTH] IN BLOOD BY AUTOMATED COUNT: 11.9 % (ref 11.5–14.5)
EST. GFR  (NO RACE VARIABLE): >60 ML/MIN/1.73 M^2
EST. GFR  (NO RACE VARIABLE): >60 ML/MIN/1.73 M^2
GLUCOSE SERPL-MCNC: 170 MG/DL (ref 70–110)
GLUCOSE SERPL-MCNC: 43 MG/DL (ref 70–110)
HCT VFR BLD AUTO: 33 % (ref 37–48.5)
HCT VFR BLD AUTO: 36.1 % (ref 37–48.5)
HGB BLD-MCNC: 11 G/DL (ref 12–16)
HGB BLD-MCNC: 11.1 G/DL (ref 12–16)
IMM GRANULOCYTES # BLD AUTO: 0.01 K/UL (ref 0–0.04)
IMM GRANULOCYTES # BLD AUTO: 0.02 K/UL (ref 0–0.04)
IMM GRANULOCYTES NFR BLD AUTO: 0.1 % (ref 0–0.5)
IMM GRANULOCYTES NFR BLD AUTO: 0.3 % (ref 0–0.5)
LYMPHOCYTES # BLD AUTO: 0.9 K/UL (ref 1–4.8)
LYMPHOCYTES # BLD AUTO: 1.5 K/UL (ref 1–4.8)
LYMPHOCYTES NFR BLD: 11.8 % (ref 18–48)
LYMPHOCYTES NFR BLD: 19.2 % (ref 18–48)
MCH RBC QN AUTO: 30.8 PG (ref 27–31)
MCH RBC QN AUTO: 31.4 PG (ref 27–31)
MCHC RBC AUTO-ENTMCNC: 30.7 G/DL (ref 32–36)
MCHC RBC AUTO-ENTMCNC: 33.3 G/DL (ref 32–36)
MCV RBC AUTO: 102 FL (ref 82–98)
MCV RBC AUTO: 92 FL (ref 82–98)
MONOCYTES # BLD AUTO: 0.3 K/UL (ref 0.3–1)
MONOCYTES # BLD AUTO: 0.5 K/UL (ref 0.3–1)
MONOCYTES NFR BLD: 4.4 % (ref 4–15)
MONOCYTES NFR BLD: 6.6 % (ref 4–15)
NEUTROPHILS # BLD AUTO: 5.4 K/UL (ref 1.8–7.7)
NEUTROPHILS # BLD AUTO: 6.2 K/UL (ref 1.8–7.7)
NEUTROPHILS NFR BLD: 71.8 % (ref 38–73)
NEUTROPHILS NFR BLD: 82.5 % (ref 38–73)
NRBC BLD-RTO: 0 /100 WBC
NRBC BLD-RTO: 0 /100 WBC
PLATELET # BLD AUTO: 224 K/UL (ref 150–450)
PLATELET # BLD AUTO: 65 K/UL (ref 150–450)
PMV BLD AUTO: 10.5 FL (ref 9.2–12.9)
PMV BLD AUTO: 12.2 FL (ref 9.2–12.9)
POCT GLUCOSE: 54 MG/DL (ref 70–110)
POTASSIUM SERPL-SCNC: 3.6 MMOL/L (ref 3.5–5.1)
POTASSIUM SERPL-SCNC: 4 MMOL/L (ref 3.5–5.1)
PROT SERPL-MCNC: 6.5 G/DL (ref 6–8.4)
RBC # BLD AUTO: 3.54 M/UL (ref 4–5.4)
RBC # BLD AUTO: 3.57 M/UL (ref 4–5.4)
SODIUM SERPL-SCNC: 134 MMOL/L (ref 136–145)
SODIUM SERPL-SCNC: 137 MMOL/L (ref 136–145)
WBC # BLD AUTO: 7.53 K/UL (ref 3.9–12.7)
WBC # BLD AUTO: 7.56 K/UL (ref 3.9–12.7)

## 2025-02-20 PROCEDURE — 25000003 PHARM REV CODE 250: Performed by: INTERNAL MEDICINE

## 2025-02-20 PROCEDURE — 80053 COMPREHEN METABOLIC PANEL: CPT | Performed by: INTERNAL MEDICINE

## 2025-02-20 PROCEDURE — 36415 COLL VENOUS BLD VENIPUNCTURE: CPT | Performed by: INTERNAL MEDICINE

## 2025-02-20 PROCEDURE — 21400001 HC TELEMETRY ROOM

## 2025-02-20 PROCEDURE — 63600175 PHARM REV CODE 636 W HCPCS: Performed by: HOSPITALIST

## 2025-02-20 PROCEDURE — 85025 COMPLETE CBC W/AUTO DIFF WBC: CPT | Mod: 91 | Performed by: INTERNAL MEDICINE

## 2025-02-20 PROCEDURE — 25000003 PHARM REV CODE 250: Performed by: HOSPITALIST

## 2025-02-20 PROCEDURE — 27000207 HC ISOLATION

## 2025-02-20 PROCEDURE — 80048 BASIC METABOLIC PNL TOTAL CA: CPT | Mod: XB | Performed by: INTERNAL MEDICINE

## 2025-02-20 PROCEDURE — 99233 SBSQ HOSP IP/OBS HIGH 50: CPT | Mod: GT,,, | Performed by: INTERNAL MEDICINE

## 2025-02-20 RX ADMIN — ONDANSETRON 4 MG: 2 INJECTION INTRAMUSCULAR; INTRAVENOUS at 05:02

## 2025-02-20 RX ADMIN — ENOXAPARIN SODIUM 40 MG: 40 INJECTION SUBCUTANEOUS at 04:02

## 2025-02-20 RX ADMIN — PIPERACILLIN SODIUM AND TAZOBACTAM SODIUM 4.5 G: 4; .5 INJECTION, POWDER, LYOPHILIZED, FOR SOLUTION INTRAVENOUS at 05:02

## 2025-02-20 RX ADMIN — PIPERACILLIN SODIUM AND TAZOBACTAM SODIUM 4.5 G: 4; .5 INJECTION, POWDER, LYOPHILIZED, FOR SOLUTION INTRAVENOUS at 09:02

## 2025-02-20 RX ADMIN — OXYCODONE HYDROCHLORIDE AND ACETAMINOPHEN 1 TABLET: 5; 325 TABLET ORAL at 05:02

## 2025-02-20 RX ADMIN — OXYCODONE HYDROCHLORIDE AND ACETAMINOPHEN 1 TABLET: 5; 325 TABLET ORAL at 04:02

## 2025-02-20 RX ADMIN — PIPERACILLIN SODIUM AND TAZOBACTAM SODIUM 4.5 G: 4; .5 INJECTION, POWDER, LYOPHILIZED, FOR SOLUTION INTRAVENOUS at 02:02

## 2025-02-20 NOTE — NURSING
Bia  from surgical team, came to CCU with information for patient. Passed to patient understood the info received will wait for office personnel from Dr. Young to call. Understood would have to wait for colonoscopy for 4-6 weeks as  and Bia stated.

## 2025-02-20 NOTE — CARE UPDATE
02/20/25 0723   Patient Assessment/Suction   Level of Consciousness (AVPU) alert   Respiratory Effort Normal   Expansion/Accessory Muscles/Retractions no use of accessory muscles   Rhythm/Pattern, Respiratory no shortness of breath reported   Cough Frequency no cough   PRE-TX-O2   Device (Oxygen Therapy) room air   SpO2 95 %   Pulse 80   Resp 14

## 2025-02-20 NOTE — PLAN OF CARE
Problem: Adult Inpatient Plan of Care  Goal: Plan of Care Review  Outcome: Progressing  Goal: Patient-Specific Goal (Individualized)  Outcome: Progressing  Goal: Absence of Hospital-Acquired Illness or Injury  Outcome: Progressing  Goal: Optimal Comfort and Wellbeing  Outcome: Progressing  Goal: Readiness for Transition of Care  Outcome: Progressing     Problem: Infection  Goal: Absence of Infection Signs and Symptoms  Outcome: Progressing     Antibiotics given overnight with no issue. IV patent and continuous fluids running. Pt VSS. Purposeful rounding complete. All questions answered. Call light within reach. Bed in locked and low position.

## 2025-02-20 NOTE — PROGRESS NOTES
[facility] Medicine  Progress Note    Patient Name: Alysia Cruz  MRN: [id number]  Patient Class: IP- Inpatient   Admission Date: 2/18/2025  Length of Stay: 1 days  Attending Physician: Melquiades Navas DO  Primary Care Provider: Brittney, Primary Doctor        Subjective:     Principal Problem:Diverticulitis of intestine with abscess    Interval History: Diverticulitis of intestine with abscess     Chief Complaint:        Chief Complaint   Patient presents with    Abdominal Pain       Pt reports intermittent lower abdominal pain to either side for over 1 week.  Reports a hx of diverticulitis.  Reports finishing amoxicillin rx 3 days ago.    Nausea    Constipation         HPI: The patient is a 48 y/o female with PMH of recurrent episodes of diverticulitis who presents with lower abdominal pain. Pain started a week ago and she was on a course of amoxicillin. She has not had any relief from the pain and also reports nausea. Work up in the ER shows diverticulitis with a sigmoid abscess. ER physician discussed the case with IR but IR felt that the abscess is intramural and small and would not be amenable to drainage at this time. Patient is being admitted for diverticulitis management. She denies any vomiting or fevers. Patient was seen in October 2024 twice for same.     Review of Systems   Constitutional:  Positive for appetite change.   HENT: Negative.     Eyes: Negative.    Respiratory:  Negative for chest tightness and shortness of breath.    Cardiovascular:  Negative for chest pain and leg swelling.   Gastrointestinal:  Positive for abdominal pain. Negative for abdominal distention, constipation, diarrhea and nausea.   Endocrine: Negative.    Genitourinary: Negative.    Musculoskeletal: Negative.    Skin: Negative.    Allergic/Immunologic: Negative.    Neurological:  Positive for weakness. Negative for dizziness.   Hematological: Negative.    Psychiatric/Behavioral: Negative.        Objective:     Vital Signs (Most Recent):  Temp: 98.1 °F (36.7 °C) (02/20/25 0740)  Pulse: 80 (02/20/25 0740)  Resp: 18 (02/20/25 0740)  BP: (!) 115/55 (02/20/25 0400)  SpO2: 97 % (02/20/25 0740) Vital Signs (24h Range):  Temp:  [97.5 °F (36.4 °C)-98.2 °F (36.8 °C)] 98.1 °F (36.7 °C)  Pulse:  [70-88] 80  Resp:  [14-19] 18  SpO2:  [94 %-100 %] 97 %  BP: (114-150)/(55-72) 115/55     Weight: 49.9 kg (110 lb 0.2 oz)  Body mass index is 20.79 kg/m².  No intake or output data in the 24 hours ending 02/20/25 1228   Physical Exam  Constitutional:       Appearance: She is ill-appearing.   HENT:      Head: Normocephalic.      Mouth/Throat:      Mouth: Mucous membranes are dry.   Eyes:      Extraocular Movements: Extraocular movements intact.      Pupils: Pupils are equal, round, and reactive to light.   Cardiovascular:      Rate and Rhythm: Regular rhythm.   Pulmonary:      Effort: Pulmonary effort is normal.      Breath sounds: Normal breath sounds.   Abdominal:      General: Abdomen is flat. There is no distension.      Tenderness: There is abdominal tenderness. There is no guarding.   Musculoskeletal:         General: Normal range of motion.      Cervical back: Normal range of motion and neck supple.   Skin:     General: Skin is warm and dry.   Neurological:      General: No focal deficit present.      Mental Status: She is alert and oriented to person, place, and time.   Psychiatric:         Mood and Affect: Mood normal.           Overview/Hospital Course:  Patient overall clinically doing much better abdominal pain is improved we would like to start moving and tolerating meals I have instructed the patient that this could start being handled outpatient she is not septic.  She has no perforation she will need a follow up CT scan with the about 3 or 4 days and she understand that.  The other thing that she needs his colonoscopy.  So we are trying to make arrangements for her outpatient colonoscopy it see GI in  "proximally about a month.  If she tolerates meals.  He has pain under control.  We will be looking at discharging home with outpatient follow up WBCs down from 13.322 7.53, hemoglobin 11.1 platelets of 65 down from 235 hold Lovenox sodium 134 potassium of 4 CO2 of 10 concern of the above labs are incorrect.  Given the shift.  And clinically improving patient.  Redraw CBC and Bmp    Significant Labs: All pertinent labs within the past 24 hours have been reviewed.  Bilirubin:   Recent Labs   Lab 02/18/25 2114 02/19/25 0328 02/20/25  0914   BILITOT 1.1* 1.5* 1.3*     Blood Culture: No results for input(s): "LABBLOO" in the last 48 hours.  BMP:   Recent Labs   Lab 02/19/25 0328 02/20/25  0914   GLU 82 43*    134*   K 3.4* 4.0    109   CO2 21* 10*   BUN 11 11   CREATININE 0.6 0.7   CALCIUM 9.0 8.8   MG 1.6  --      CBC:   Recent Labs   Lab 02/18/25 2114 02/19/25 0328 02/20/25  0914   WBC 13.32* 11.27 7.53   HGB 13.2 11.9* 11.1*   HCT 38.8 34.8* 36.1*    235 65*     CMP:   Recent Labs   Lab 02/18/25 2114 02/19/25 0328 02/20/25  0914    138 134*   K 3.7 3.4* 4.0    105 109   CO2 25 21* 10*   GLU 96 82 43*   BUN 14 11 11   CREATININE 0.6 0.6 0.7   CALCIUM 9.8 9.0 8.8   PROT 7.7 6.6 6.5   ALBUMIN 3.9 3.3* 3.0*   BILITOT 1.1* 1.5* 1.3*   ALKPHOS 82 72 73   AST 10 9* 10   ALT 5* <5* <5*   ANIONGAP 10 12 15     Cardiac Markers: No results for input(s): "CKMB", "MYOGLOBIN", "BNP", "TROPISTAT" in the last 48 hours.  Coagulation: No results for input(s): "PT", "INR", "APTT" in the last 48 hours.  Lactic Acid: No results for input(s): "LACTATE" in the last 48 hours.    Significant Imaging: I have reviewed all pertinent imaging results/findings within the past 24 hours.    Assessment/Plan:     Diverticulitis of intestine with abscess  CT showed: Continued wall thickening of the sigmoid colon, suspicious for acute, sigmoid diverticulitis.  Associated rim enhancing fluid collection within the wall " of the sigmoid colon is concerning for an abscess.  A neoplastic process is not excluded, and continued close interval follow-up is recommended, with consideration for direct visualization and tissue sampling. There is a rim enhancing fluid collection within the wall of the sigmoid colon measuring approximately 2.9 x 2.6 cm compatible with an abscess.      Per IR recommendations, patient will need repeat CT in 5 days  Continue zosyn, IVF, pain control, and antiemetics       Patient is clinically doing better we have some labs at seem to be off 1 with the platelets have dropped from a 235-65 a bicarb gone from 21-10 with the patient is showing good clinical improvements.  We are going to redraw labs also noted asymptomatic blood sugar of 54.    Active Diagnoses:    Diagnosis Date Noted POA    PRINCIPAL PROBLEM:  Diverticulitis of intestine with abscess [K57.80] 02/19/2025 Yes      Problems Resolved During this Admission:     VTE Risk Mitigation (From admission, onward)           Ordered     enoxaparin injection 40 mg  Daily         02/19/25 0150                       Melquiades Navas DO  Department of Hospital Medicine   [facility]

## 2025-02-20 NOTE — NURSING
Received critical from lab regarding blood glucose of 43. POCT glucose performed with 54 as result. Dr. Navas notified. Patient is asymptomatic and currently in bed drinking a sonic slushie. Primary nurse notified

## 2025-02-21 VITALS
HEIGHT: 61 IN | WEIGHT: 110 LBS | SYSTOLIC BLOOD PRESSURE: 133 MMHG | OXYGEN SATURATION: 99 % | HEART RATE: 78 BPM | TEMPERATURE: 97 F | DIASTOLIC BLOOD PRESSURE: 76 MMHG | RESPIRATION RATE: 18 BRPM | BODY MASS INDEX: 20.77 KG/M2

## 2025-02-21 LAB
ANION GAP SERPL CALC-SCNC: 8 MMOL/L (ref 8–16)
BASOPHILS # BLD AUTO: 0.05 K/UL (ref 0–0.2)
BASOPHILS NFR BLD: 0.7 % (ref 0–1.9)
BUN SERPL-MCNC: 6 MG/DL (ref 6–20)
CALCIUM SERPL-MCNC: 9.2 MG/DL (ref 8.7–10.5)
CHLORIDE SERPL-SCNC: 107 MMOL/L (ref 95–110)
CO2 SERPL-SCNC: 22 MMOL/L (ref 23–29)
CREAT SERPL-MCNC: 0.7 MG/DL (ref 0.5–1.4)
DIFFERENTIAL METHOD BLD: ABNORMAL
EOSINOPHIL # BLD AUTO: 0.2 K/UL (ref 0–0.5)
EOSINOPHIL NFR BLD: 2.6 % (ref 0–8)
ERYTHROCYTE [DISTWIDTH] IN BLOOD BY AUTOMATED COUNT: 12.1 % (ref 11.5–14.5)
EST. GFR  (NO RACE VARIABLE): >60 ML/MIN/1.73 M^2
GLUCOSE SERPL-MCNC: 112 MG/DL (ref 70–110)
HCT VFR BLD AUTO: 35.6 % (ref 37–48.5)
HGB BLD-MCNC: 12.2 G/DL (ref 12–16)
IMM GRANULOCYTES # BLD AUTO: 0.04 K/UL (ref 0–0.04)
IMM GRANULOCYTES NFR BLD AUTO: 0.5 % (ref 0–0.5)
LYMPHOCYTES # BLD AUTO: 1.1 K/UL (ref 1–4.8)
LYMPHOCYTES NFR BLD: 14.6 % (ref 18–48)
MCH RBC QN AUTO: 31.1 PG (ref 27–31)
MCHC RBC AUTO-ENTMCNC: 34.3 G/DL (ref 32–36)
MCV RBC AUTO: 91 FL (ref 82–98)
MONOCYTES # BLD AUTO: 0.5 K/UL (ref 0.3–1)
MONOCYTES NFR BLD: 7.2 % (ref 4–15)
NEUTROPHILS # BLD AUTO: 5.5 K/UL (ref 1.8–7.7)
NEUTROPHILS NFR BLD: 74.4 % (ref 38–73)
NRBC BLD-RTO: 0 /100 WBC
PLATELET # BLD AUTO: 289 K/UL (ref 150–450)
PMV BLD AUTO: 11.1 FL (ref 9.2–12.9)
POTASSIUM SERPL-SCNC: 3.6 MMOL/L (ref 3.5–5.1)
RBC # BLD AUTO: 3.92 M/UL (ref 4–5.4)
SODIUM SERPL-SCNC: 137 MMOL/L (ref 136–145)
WBC # BLD AUTO: 7.33 K/UL (ref 3.9–12.7)

## 2025-02-21 PROCEDURE — 63600175 PHARM REV CODE 636 W HCPCS: Performed by: HOSPITALIST

## 2025-02-21 PROCEDURE — 80048 BASIC METABOLIC PNL TOTAL CA: CPT | Performed by: INTERNAL MEDICINE

## 2025-02-21 PROCEDURE — 25000003 PHARM REV CODE 250: Performed by: HOSPITALIST

## 2025-02-21 PROCEDURE — 99239 HOSP IP/OBS DSCHRG MGMT >30: CPT | Mod: ,,, | Performed by: INTERNAL MEDICINE

## 2025-02-21 PROCEDURE — 85025 COMPLETE CBC W/AUTO DIFF WBC: CPT | Performed by: INTERNAL MEDICINE

## 2025-02-21 PROCEDURE — 36415 COLL VENOUS BLD VENIPUNCTURE: CPT | Performed by: INTERNAL MEDICINE

## 2025-02-21 RX ORDER — METRONIDAZOLE 500 MG/100ML
500 INJECTION, SOLUTION INTRAVENOUS 3 TIMES DAILY
Status: DISCONTINUED | OUTPATIENT
Start: 2025-02-21 | End: 2025-02-21 | Stop reason: HOSPADM

## 2025-02-21 RX ORDER — CIPROFLOXACIN 250 MG/1
500 TABLET, FILM COATED ORAL EVERY 12 HOURS
Status: DISCONTINUED | OUTPATIENT
Start: 2025-02-21 | End: 2025-02-21 | Stop reason: HOSPADM

## 2025-02-21 RX ORDER — METRONIDAZOLE 500 MG/1
500 TABLET ORAL EVERY 8 HOURS
Qty: 21 TABLET | Refills: 0 | Status: SHIPPED | OUTPATIENT
Start: 2025-02-21

## 2025-02-21 RX ORDER — CIPROFLOXACIN 500 MG/1
500 TABLET ORAL EVERY 12 HOURS
Qty: 14 TABLET | Refills: 0 | Status: SHIPPED | OUTPATIENT
Start: 2025-02-21

## 2025-02-21 RX ADMIN — PIPERACILLIN SODIUM AND TAZOBACTAM SODIUM 4.5 G: 4; .5 INJECTION, POWDER, LYOPHILIZED, FOR SOLUTION INTRAVENOUS at 02:02

## 2025-02-21 RX ADMIN — PIPERACILLIN SODIUM AND TAZOBACTAM SODIUM 4.5 G: 4; .5 INJECTION, POWDER, LYOPHILIZED, FOR SOLUTION INTRAVENOUS at 05:02

## 2025-02-21 NOTE — PLAN OF CARE
02/21/25 1511   Final Note   Assessment Type Final Discharge Note   Anticipated Discharge Disposition Home   What phone number can be called within the next 1-3 days to see how you are doing after discharge?   ([phone])   Hospital Resources/Appts/Education Provided Post-Acute resouces added to AVS;Appointments scheduled and added to AVS     Pt clear for DC from case management standpoint. Discharging to home family in room to transport. Referral sent to Dr. Young for appt, and PCP appt made.

## 2025-02-21 NOTE — NURSING
Patient DC'D home by private vehicle. Appointments  set. Understanding AVS work note provided. Follow up with PCP planned , CT scan appointment set for follow up

## 2025-02-21 NOTE — PLAN OF CARE
Problem: Adult Inpatient Plan of Care  Goal: Plan of Care Review  Outcome: Progressing  Goal: Patient-Specific Goal (Individualized)  Outcome: Progressing  Goal: Absence of Hospital-Acquired Illness or Injury  Outcome: Progressing  Goal: Optimal Comfort and Wellbeing  Outcome: Progressing  Goal: Readiness for Transition of Care  Outcome: Progressing     Problem: Infection  Goal: Absence of Infection Signs and Symptoms  Outcome: Progressing   Chart check completed.    Patient had one episode of diarrhea (self reported) at roughly 0000 and flushed it. Second episode of loose stool was collected at roughly 0300. C-diff sample  at 0000. Doctor notified to see if he wanted sample from 0300 to send. Doctor declined sending sample. No new order given. C-diff precautions eliminated due to standard 48 hour protocol.     Pt VSS. Hourly rounding complete. All questions answered. Call light within reach. Bed in locked and low position.

## 2025-02-21 NOTE — PLAN OF CARE
02/21/25 0827   Post-Acute Status   Hospital Resources/Appts/Education Provided Post-Acute resouces added to AVS     Sent referral to Dr. Young's office for them to schedule a F/U with pt needs colonoscopy in 4-6 weeks

## 2025-02-21 NOTE — DISCHARGE SUMMARY
[facility] Medicine  Discharge Summary      Patient Name: Alysia Cruz  MRN: [id number]  Admission Date: 2/18/2025  Hospital Length of Stay: 2 days  Discharge Date and Time:  02/21/2025 2:47 PM  Attending Physician: Melquiades Navas DO   Discharging Provider: Melquiades Navas DO  Discharge Provider Team: Networked reference to record PCT   Primary Care Provider: Brittney, Primary Doctor        HPI:   Abdominal Pain        Pt reports intermittent lower abdominal pain to either side for over 1 week.  Reports a hx of diverticulitis.  Reports finishing amoxicillin rx 3 days ago.    Nausea    Constipation         HPI: The patient is a 48 y/o female with PMH of recurrent episodes of diverticulitis who presents with lower abdominal pain. Pain started a week ago and she was on a course of amoxicillin. She has not had any relief from the pain and also reports nausea. Work up in the ER shows diverticulitis with a sigmoid abscess. ER physician discussed the case with IR but IR felt that the abscess is intramural and small and would not be amenable to drainage at this time. Patient is being admitted for diverticulitis management. She denies any vomiting or fevers. Patient was seen in October 2024 twice for same.     * No surgery found *      Hospital Course:  Patient overall clinically doing much better abdominal pain is improved we would like to start moving and tolerating meals I have instructed the patient that this could start being handled outpatient she is not septic.  She has no perforation she will need a follow up CT scan with the about 3 or 4 days and she understand that.  The other thing that she needs his colonoscopy.  So we are trying to make arrangements for her outpatient colonoscopy it see GI in proximally about a month.  If she tolerates meals.  He has pain under control.  We will be looking at discharging home with outpatient follow up WBCs down from 13.322 7.53, hemoglobin  11.1 platelets of 65 down from 235 hold Lovenox sodium 134 potassium of 4 CO2 of 10 concern of the above labs are incorrect.  Given the shift.  And clinically improving patient.  Redraw CBC and Bmp WBCs at 7.33, hemoglobin 12.2 platelets of 289 sodium 137 potassium 3.6 CO2 of 22 BUN of 6 creatinine 0.7 CT scan on admission showedpression:     Continued wall thickening of the sigmoid colon, suspicious for acute, sigmoid diverticulitis.  Associated rim enhancing fluid collection within the wall of the sigmoid colon is concerning for an abscess.  A neoplastic process is not excluded, and continued close interval follow-up is recommended, with consideration for direct visualization and tissue sampling.    Patient will be discharged home on Cipro and Flagyl for another 7 days we will have a repeat CT scan of the abdomen on Monday to check on the micro abscess.              Consults:   GI has been consulted appointment has been set up in the outpatient setting    Final Active Diagnoses:    Diagnosis Date Noted POA    PRINCIPAL PROBLEM:  Diverticulitis of intestine with abscess [K57.80] 02/19/2025 Yes      Problems Resolved During this Admission:      Discharged Condition: good    Disposition: Home or Self Care    Follow Up:   Follow-up Information       Alex Gamboa MD Follow up on 2/27/2025.    Specialty: Family Medicine  Why: Hospital follow- up appt. Thurdsmarcella Feb 27th at 4:20PM  Contact information:  [address] [facility] [place] [address]  [phone]               Dwayne Young MD. Schedule an appointment as soon as possible for a visit in 2 week(s).    Specialty: Gastroenterology  Contact information:  [address] [facility] [place] [id number] [phone]                           Patient Instructions:      Ambulatory referral/consult to Gastroenterology   Standing Status: Future   Referral Priority: Routine Referral Type: Consultation   Referral Reason: Specialty Services Required   Referred to  "Provider: DAVI HERRERA Requested Specialty: Gastroenterology   Number of Visits Requested: 1     Medications:  Reconciled Home Medications:      Medication List        START taking these medications      ciprofloxacin HCl 500 MG tablet  Commonly known as: CIPRO  Take 1 tablet (500 mg total) by mouth every 12 (twelve) hours.     metroNIDAZOLE 500 MG tablet  Commonly known as: FLAGYL  Take 1 tablet (500 mg total) by mouth every 8 (eight) hours.            CONTINUE taking these medications      chlorhexidine 0.12 % solution  Commonly known as: PERIDEX  15 mLs 2 (two) times daily.     oxyCODONE-acetaminophen 5-325 mg per tablet  Commonly known as: PERCOCET  Take 1 tablet by mouth every 6 (six) hours as needed for Pain.            STOP taking these medications      HYDROcodone-acetaminophen 7.5-325 mg per tablet  Commonly known as: NORCO     ondansetron 4 MG Tbdl  Commonly known as: ZOFRAN-ODT              Significant Diagnostic Studies: Labs: BMP:   Recent Labs   Lab 02/20/25  0914 02/20/25  1401 02/21/25  1043   GLU 43* 170* 112*   * 137 137   K 4.0 3.6 3.6    111* 107   CO2 10* 18* 22*   BUN 11 11 6   CREATININE 0.7 0.7 0.7   CALCIUM 8.8 8.8 9.2   , CMP   Recent Labs   Lab 02/20/25  0914 02/20/25  1401 02/21/25  1043   * 137 137   K 4.0 3.6 3.6    111* 107   CO2 10* 18* 22*   GLU 43* 170* 112*   BUN 11 11 6   CREATININE 0.7 0.7 0.7   CALCIUM 8.8 8.8 9.2   PROT 6.5  --   --    ALBUMIN 3.0*  --   --    BILITOT 1.3*  --   --    ALKPHOS 73  --   --    AST 10  --   --    ALT <5*  --   --    ANIONGAP 15 8 8   , and Troponin No results for input(s): "TROPONINI" in the last 168 hours.  Radiology: CT scan: CT ABDOMEN PELVIS WITH CONTRAST:   Results for orders placed or performed during the hospital encounter of 02/18/25   CT Abdomen Pelvis With IV Contrast NO Oral Contrast    Narrative    EXAMINATION:  CT ABDOMEN PELVIS WITH IV CONTRAST    CLINICAL HISTORY:  Abdominal pain, acute, " nonlocalized;    TECHNIQUE:  Axial CT images with sagittal and coronal reformats were obtained of the abdomen and pelvis from the hemidiaphragms through the symphysis pubis after the administration of 75mL Omnipaque 350.    COMPARISON:  CT of the abdomen and pelvis from 10/31/2024.    FINDINGS:  Lung Bases: Clear.    Heart: Heart size is normal.  No pericardial effusion.    Liver: The liver is enlarged.  There are no focal hepatic lesions.  The portal vasculature is patent.    Biliary tract: No intrahepatic or extrahepatic biliary ductal dilatation.    Gallbladder: No radiodense gallstone. No wall thickening or pericholecystic fluid.    Pancreas: Normal. No pancreatic ductal dilatation.    Spleen: Normal size without focal lesion.    Adrenals: Unremarkable.    Kidneys and urinary collecting systems: Normal.  No hydronephrosis or urolithiasis.    Lymph nodes: None enlarged.    Stomach and bowel: The stomach is normal.  Again seen is wall thickening and stranding of the sigmoid colon, compatible with diverticulitis.  There is a rim enhancing fluid collection within the wall of the sigmoid colon measuring approximately 2.9 x 2.6 cm (series 2, image 66), compatible with an abscess.  There is no bowel obstruction.    Peritoneum and mesentery: No ascites or free intraperitoneal air.  No abdominal fluid collection.    Vasculature: No aneurysm or significant atherosclerosis.    Urinary bladder: No wall thickening.    Reproductive organs: The uterus is absent.    Body wall: No abnormality.    Musculoskeletal: No aggressive osseous lesion.      Impression    Continued wall thickening of the sigmoid colon, suspicious for acute, sigmoid diverticulitis.  Associated rim enhancing fluid collection within the wall of the sigmoid colon is concerning for an abscess.  A neoplastic process is not excluded, and continued close interval follow-up is recommended, with consideration for direct visualization and tissue  sampling.      Electronically signed by: Wallace Leon  Date:    02/18/2025  Time:    23:16       Pending Diagnostic Studies:       None          Indwelling Lines/Drains at time of discharge:   Lines/Drains/Airways       None                   Time spent on the discharge of patient: 38 minutes         Melquiades Navas DO  Department of Hospital Medicine  [facility]

## 2025-02-21 NOTE — NURSING
"Pt had a loose stool around 0000 but did not notify me until she flushed it. Her c-diff precautions  at 0014. I informed her if she had another one, to please let me know. She informed me again around 0300 of another loose stool in the bedside commode.  was asked for a new c-diff order to collect. He stated "I'd rather not. If she didn't get it collected in the timeframe it is unlikely Cdiff.". No new orders given.  "

## 2025-02-26 ENCOUNTER — HOSPITAL ENCOUNTER (OUTPATIENT)
Dept: RADIOLOGY | Facility: HOSPITAL | Age: 50
Discharge: HOME | End: 2025-02-26
Attending: INTERNAL MEDICINE
Payer: COMMERCIAL

## 2025-02-26 DIAGNOSIS — K57.20 DIVERTICULITIS OF LARGE INTESTINE WITH ABSCESS WITHOUT BLEEDING: ICD-10-CM

## 2025-02-26 PROCEDURE — 25500020 PHARM REV CODE 255: Performed by: INTERNAL MEDICINE

## 2025-02-26 PROCEDURE — 74177 CT ABD & PELVIS W/CONTRAST: CPT | Mod: 26,,, | Performed by: RADIOLOGY

## 2025-02-26 PROCEDURE — 74177 CT ABD & PELVIS W/CONTRAST: CPT | Mod: TC

## 2025-02-26 RX ADMIN — IOHEXOL 30 ML: 350 INJECTION, SOLUTION INTRAVENOUS at 10:02

## 2025-02-26 RX ADMIN — IOHEXOL 42 ML: 350 INJECTION, SOLUTION INTRAVENOUS at 12:02

## 2025-02-27 ENCOUNTER — TELEPHONE (OUTPATIENT)
Dept: RADIOLOGY | Facility: HOSPITAL | Age: 50
End: 2025-02-27
Payer: COMMERCIAL

## 2025-02-27 ENCOUNTER — HOSPITAL ENCOUNTER (OUTPATIENT)
Dept: RADIOLOGY | Facility: HOSPITAL | Age: 50
Discharge: HOME | End: 2025-02-27
Attending: FAMILY MEDICINE
Payer: COMMERCIAL

## 2025-02-27 ENCOUNTER — TELEPHONE (OUTPATIENT)
Dept: FAMILY MEDICINE | Facility: CLINIC | Age: 50
End: 2025-02-27
Payer: COMMERCIAL

## 2025-02-27 ENCOUNTER — OFFICE VISIT (OUTPATIENT)
Dept: FAMILY MEDICINE | Facility: CLINIC | Age: 50
End: 2025-02-27
Payer: COMMERCIAL

## 2025-02-27 VITALS
SYSTOLIC BLOOD PRESSURE: 140 MMHG | DIASTOLIC BLOOD PRESSURE: 82 MMHG | WEIGHT: 106 LBS | OXYGEN SATURATION: 97 % | BODY MASS INDEX: 20.01 KG/M2 | HEART RATE: 94 BPM | HEIGHT: 61 IN

## 2025-02-27 DIAGNOSIS — Z13.31 POSITIVE DEPRESSION SCREENING: ICD-10-CM

## 2025-02-27 DIAGNOSIS — Z12.39 ENCOUNTER FOR SCREENING FOR MALIGNANT NEOPLASM OF BREAST, UNSPECIFIED SCREENING MODALITY: Primary | ICD-10-CM

## 2025-02-27 DIAGNOSIS — M54.2 CERVICALGIA: ICD-10-CM

## 2025-02-27 DIAGNOSIS — K57.80 DIVERTICULAR DISEASE OF INTESTINE WITH PERFORATION AND ABSCESS: ICD-10-CM

## 2025-02-27 DIAGNOSIS — G89.29 CHRONIC PAIN OF RIGHT KNEE: ICD-10-CM

## 2025-02-27 DIAGNOSIS — M51.26 LUMBAGO DUE TO DISPLACEMENT OF INTERVERTEBRAL DISC: ICD-10-CM

## 2025-02-27 DIAGNOSIS — M25.561 CHRONIC PAIN OF RIGHT KNEE: ICD-10-CM

## 2025-02-27 DIAGNOSIS — S83.511A RUPTURE OF ANTERIOR CRUCIATE LIGAMENT OF RIGHT KNEE, INITIAL ENCOUNTER: ICD-10-CM

## 2025-02-27 PROCEDURE — 72040 X-RAY EXAM NECK SPINE 2-3 VW: CPT | Mod: 26,,, | Performed by: RADIOLOGY

## 2025-02-27 PROCEDURE — 99999 PR PBB SHADOW E&M-EST. PATIENT-LVL V: CPT | Mod: PBBFAC,,, | Performed by: FAMILY MEDICINE

## 2025-02-27 PROCEDURE — 72100 X-RAY EXAM L-S SPINE 2/3 VWS: CPT | Mod: TC

## 2025-02-27 PROCEDURE — 72100 X-RAY EXAM L-S SPINE 2/3 VWS: CPT | Mod: 26,,, | Performed by: RADIOLOGY

## 2025-02-27 PROCEDURE — 72040 X-RAY EXAM NECK SPINE 2-3 VW: CPT | Mod: TC

## 2025-02-27 NOTE — LETTER
February 27, 2025      [facility]  [address]  [address]  [facility] [address]  Phone: [phone]  Fax: [phone]       Patient: Alysia Cruz   YOB: 1975  Date of Visit: 02/27/2025    To Whom It May Concern:    Myah Cruz  was at [facility] on 02/27/2025. The patient may return to work/school on 3/10/2025 with no restrictions. If you have any questions or concerns, or if I can be of further assistance, please do not hesitate to contact me.    Sincerely,    Areli Ramírez MA

## 2025-02-28 NOTE — PROGRESS NOTES
[facility]      Subjective    Subjective     Patient ID:   Patient admitted to [id number] through 02/21/2025.  Acute diverticulitis with abscess sigmoid colon.  Patient doing well, now on outpatient antibiotics, has follow-up with Gastroenterology and repeat imaging consideration for further surgical intervention versus watchful waiting.  Patient reports pain is minimal.  No fever.  Appetite stable.  Also ask for referral for orthopedic surgery, history of MRI right knee with ACL tear.  The patient has not had insurance for quite some time and now would like to improve her knee which is causing her pain, swelling, and discomfort at work.  She also has chronic neck and low back pain.  Denies injury.  Daily pain 5/10.  Mild radiation into the right arm.  No exacerbating or alleviating factors.  She would like to do a mammogram, she has has never completed a mammogram and has no current concerns.    Leg Pain     Back Pain  Associated symptoms include leg pain.   Thyroid Problem        Chief Complaint:   Chief Complaint   Patient presents with    hospitals Care     Hosp Fu diverticulitis, work note     Leg Pain     Right leg torn ACL and Cyst, left leg sciatica     Back Pain     Bulged herniation and deteriorating discs    Thyroid Problem     Left side mass, no imaging since 2017        History reviewed. No pertinent past medical history.     Past Surgical History:   Procedure Laterality Date    HYSTERECTOMY          Review of patient's allergies indicates:   Allergen Reactions    Sulfa (sulfonamide antibiotics) Rash             Review of Systems   Constitutional: Negative.    HENT: Negative.     Eyes: Negative.    Respiratory: Negative.     Cardiovascular: Negative.    Gastrointestinal: Negative.         Denies resting abdominal pain.  Bowel movements soft, brown.  No nausea.   Musculoskeletal:  Positive for back pain and neck pain. Negative for myalgias.   Skin: Negative.    Neurological:  Negative.    Endo/Heme/Allergies: Negative.    Psychiatric/Behavioral: Negative.                   Objective    Objective   Vitals:    02/27/25 1614   BP: (!) 140/82   Pulse: 94        Physical Exam  Vitals reviewed.   Constitutional:       Appearance: Normal appearance.   HENT:      Head: Normocephalic and atraumatic.      Right Ear: Tympanic membrane normal.      Left Ear: Tympanic membrane normal.      Mouth/Throat:      Mouth: Mucous membranes are dry.   Eyes:      Extraocular Movements: Extraocular movements intact.   Cardiovascular:      Rate and Rhythm: Normal rate and regular rhythm.      Pulses: Normal pulses.   Pulmonary:      Effort: Pulmonary effort is normal.      Breath sounds: Normal breath sounds.   Abdominal:      Comments: Bowel sounds quiet, Abdomen soft, flat.  Mild tenderness to deep palpation left lower quadrant.  No appreciable masses or organomegaly   Musculoskeletal:         General: Normal range of motion.      Cervical back: Neck supple.      Comments: Cervical spine:  There is no midline tenderness, rotation left and right of the neck full range, mild subjective discomfort.  Full flexion-extension of the neck mild crepitance noted minimal tenderness.  Mild paraspinal tenderness C5-7 right-greater-than-left.    Lumbar spine:  No midline tenderness.  Forward flexion and extension full, mild tenderness.  Subjective tenderness L3-S1 paraspinal right-greater-than-left.   Skin:     General: Skin is warm and dry.   Neurological:      General: No focal deficit present.      Mental Status: She is alert.   Psychiatric:         Mood and Affect: Mood normal.          Current Medications[1]        Assessment & Plan     Assessment & Plan  Diverticular disease of intestine with perforation and abscess  Follow up with Gastroenterology.  I have reviewed the imaging which reveals a sigmoid source of probable infection within the wall of sigmoid colon.  Patient instructed to complete antibiotics and monitor  for symptoms including pain, diarrhea, fever and report them immediately.         Encounter for screening for malignant neoplasm of breast, unspecified screening modality  Recommend baseline mammogram    Orders:    Mammo Digital Screening Bilat; Future    Cervicalgia  Patient may use over-the-counter analgesic, topical heat, heating pads etc..  We will follow up after x-rays complete    Orders:    X-Ray Cervical Spine AP And Lateral; Future    Lumbago due to displacement of intervertebral disc  X-ray ordered.  Consider physical therapy.  No red flag symptom.  Likely degenerative in nature.    Orders:    X-Ray Lumbar Spine AP And Lateral; Future    Chronic pain of right knee  Patient has copy of MRI report revealing compromise of ACL.  Refer orthopedics.    Orders:    Ambulatory referral/consult to Orthopedics; Future    Rupture of anterior cruciate ligament of right knee, initial encounter  Referral orthopedic.  Patient has old MRI revealing compromise of ACL and meniscal tear.    Orders:    Ambulatory referral/consult to Orthopedics; Future      Patient follow-up in 1 month for neck and back films.  Patient to complete mammogram.       Alex Gamboa MD  [facility]  [address] [place] [address],[place] [address]  [phone]         [1]   Current Outpatient Medications:     chlorhexidine (PERIDEX) 0.12 % solution, 15 mLs 2 (two) times daily., Disp: , Rfl:     ciprofloxacin HCl (CIPRO) 500 MG tablet, Take 1 tablet (500 mg total) by mouth every 12 (twelve) hours., Disp: 14 tablet, Rfl: 0    metroNIDAZOLE (FLAGYL) 500 MG tablet, Take 1 tablet (500 mg total) by mouth every 8 (eight) hours., Disp: 21 tablet, Rfl: 0

## 2025-02-28 NOTE — ASSESSMENT & PLAN NOTE
Referral orthopedic.  Patient has old MRI revealing compromise of ACL and meniscal tear.    Orders:    Ambulatory referral/consult to Orthopedics; Future

## 2025-03-17 ENCOUNTER — OFFICE VISIT (OUTPATIENT)
Dept: FAMILY MEDICINE | Facility: CLINIC | Age: 50
End: 2025-03-17
Payer: COMMERCIAL

## 2025-03-17 VITALS
BODY MASS INDEX: 20.96 KG/M2 | HEIGHT: 61 IN | DIASTOLIC BLOOD PRESSURE: 82 MMHG | OXYGEN SATURATION: 99 % | HEART RATE: 77 BPM | SYSTOLIC BLOOD PRESSURE: 124 MMHG | WEIGHT: 111 LBS

## 2025-03-17 DIAGNOSIS — M54.2 CERVICALGIA: Primary | ICD-10-CM

## 2025-03-17 DIAGNOSIS — M54.42 CHRONIC MIDLINE LOW BACK PAIN WITH LEFT-SIDED SCIATICA: ICD-10-CM

## 2025-03-17 DIAGNOSIS — G89.29 CHRONIC MIDLINE LOW BACK PAIN WITH LEFT-SIDED SCIATICA: ICD-10-CM

## 2025-03-17 PROCEDURE — 99999 PR PBB SHADOW E&M-EST. PATIENT-LVL III: CPT | Mod: PBBFAC,,, | Performed by: FAMILY MEDICINE

## 2025-03-17 RX ORDER — DICYCLOMINE HYDROCHLORIDE 20 MG/1
20 TABLET ORAL 2 TIMES DAILY
COMMUNITY
Start: 2025-02-27

## 2025-03-17 RX ORDER — METHYLPREDNISOLONE ACETATE 40 MG/ML
40 INJECTION, SUSPENSION INTRA-ARTICULAR; INTRALESIONAL; INTRAMUSCULAR; SOFT TISSUE
Status: COMPLETED | OUTPATIENT
Start: 2025-03-17 | End: 2025-03-17

## 2025-03-17 RX ORDER — GABAPENTIN 300 MG/1
300 CAPSULE ORAL 2 TIMES DAILY
Qty: 60 CAPSULE | Refills: 2 | Status: SHIPPED | OUTPATIENT
Start: 2025-03-17 | End: 2026-03-17

## 2025-03-17 RX ADMIN — METHYLPREDNISOLONE ACETATE 40 MG: 40 INJECTION, SUSPENSION INTRA-ARTICULAR; INTRALESIONAL; INTRAMUSCULAR; SOFT TISSUE at 09:03

## 2025-03-17 NOTE — PROGRESS NOTES
"[facility]      Subjective    Subjective     Patient ID:   Eloisa 49-year-old  female follow-up after obtaining cervical neck and lumbosacral x-rays.  The patient is scheduling for Orthopedics for right ACL tear.  The patient reports chronic musculoskeletal pain predominantly in the neck and back with left lower extremity radiculopathy.  There is no saddle anesthesia, pain worsens with standing, improves with rest.  Pain greatest 8/10.  Today 3/10.  Has tried "everything over-the-counter".  Without significant relief    Follow-up  Associated symptoms include neck pain. Pertinent negatives include no myalgias.       Chief Complaint:   Chief Complaint   Patient presents with    Follow-up     Feeling better         No past medical history on file.     Past Surgical History:   Procedure Laterality Date    HYSTERECTOMY          Review of patient's allergies indicates:   Allergen Reactions    Sulfa (sulfonamide antibiotics) Rash             Review of Systems   Constitutional: Negative.    HENT: Negative.     Eyes: Negative.    Respiratory: Negative.     Cardiovascular: Negative.    Gastrointestinal: Negative.    Musculoskeletal:  Positive for back pain and neck pain. Negative for joint pain and myalgias.   Skin: Negative.    Neurological: Negative.    Endo/Heme/Allergies: Negative.    Psychiatric/Behavioral: Negative.                   Objective    Objective   Vitals:    03/17/25 0816   BP: 124/82   Pulse: 77        Physical Exam     Physical Exam  Vitals reviewed.   Constitutional:       Appearance: Normal appearance.   HENT:      Head: Normocephalic and atraumatic.      Right Ear: Tympanic membrane normal.      Left Ear: Tympanic membrane normal.      Mouth/Throat:      Mouth: Mucous membranes are dry.   Eyes:      Extraocular Movements: Extraocular movements intact.   Cardiovascular:      Rate and Rhythm: Normal rate and regular rhythm.      Pulses: Normal pulses.   Pulmonary:    "   Effort: Pulmonary effort is normal.      Breath sounds: Normal breath sounds.   Abdominal:      Comments: Bowel sounds quiet, Abdomen soft, flat.  Mild tenderness to deep palpation left lower quadrant.  No appreciable masses or organomegaly   Musculoskeletal:         General: Normal range of motion.      Cervical back: Neck supple.      Comments: Cervical spine:  There is no midline tenderness, rotation left and right of the neck full range, mild subjective discomfort.  Full flexion-extension of the neck mild crepitance noted minimal tenderness.  Mild paraspinal tenderness C5-7 right-greater-than-left.     Lumbar spine:  No midline tenderness.  Forward flexion and extension full, mild tenderness.  Subjective tenderness L3-S1 paraspinal right-greater-than-left.   Skin:     General: Skin is warm and dry.   Neurological:      General: No focal deficit present.      Mental Status: She is alert.   Psychiatric:         Mood and Affect: Mood normal  Current Medications[1]        Assessment & Plan     Assessment & Plan  Cervicalgia  Depo-Medrol 80 mg IM now x1.  Physical therapy deferred.  Start gabapentin b.i.d..         Chronic midline low back pain with left-sided sciatica  X-rays reviewed with the patient.  He descriptive explanation was given to the patient including verbal description, photographs, and actual x-ray images.  Depo-Medrol IM x1.  Physical therapy deferred.  Discussed potential therapies including: Acupuncture, chiropractic therapy, yoga, and physical therapy.  We will start Gabapentin b.i.d..             Alex Gamboa MD  [facility]  [address] [facility] [place] [address]  [phone]         [1]   Current Outpatient Medications:     chlorhexidine (PERIDEX) 0.12 % solution, 15 mLs 2 (two) times daily., Disp: , Rfl:     dicyclomine (BENTYL) 20 mg tablet, Take 20 mg by mouth 2 (two) times daily. (Patient taking differently: Take 20 mg by mouth continuous prn.),  Disp: , Rfl:     gabapentin (NEURONTIN) 300 MG capsule, Take 1 capsule (300 mg total) by mouth 2 (two) times daily., Disp: 60 capsule, Rfl: 2    metroNIDAZOLE (FLAGYL) 500 MG tablet, Take 1 tablet (500 mg total) by mouth every 8 (eight) hours. (Patient not taking: Reported on 3/17/2025), Disp: 21 tablet, Rfl: 0  No current facility-administered medications for this visit.

## 2025-06-02 ENCOUNTER — PATIENT MESSAGE (OUTPATIENT)
Dept: ADMINISTRATIVE | Facility: HOSPITAL | Age: 50
End: 2025-06-02
Payer: COMMERCIAL

## 2025-06-16 ENCOUNTER — OFFICE VISIT (OUTPATIENT)
Dept: FAMILY MEDICINE | Facility: CLINIC | Age: 50
End: 2025-06-16
Payer: COMMERCIAL

## 2025-06-16 VITALS
SYSTOLIC BLOOD PRESSURE: 130 MMHG | HEIGHT: 61 IN | HEART RATE: 95 BPM | BODY MASS INDEX: 20.65 KG/M2 | DIASTOLIC BLOOD PRESSURE: 60 MMHG | WEIGHT: 109.38 LBS | OXYGEN SATURATION: 99 %

## 2025-06-16 DIAGNOSIS — Z13.6 SCREENING FOR CARDIOVASCULAR CONDITION: ICD-10-CM

## 2025-06-16 DIAGNOSIS — M54.10 RADICULOPATHY AFFECTING UPPER EXTREMITY: ICD-10-CM

## 2025-06-16 DIAGNOSIS — M54.16 LUMBAR RADICULOPATHY: Primary | ICD-10-CM

## 2025-06-16 DIAGNOSIS — M54.2 CERVICALGIA: ICD-10-CM

## 2025-06-16 DIAGNOSIS — M41.25 OTHER IDIOPATHIC SCOLIOSIS, THORACOLUMBAR REGION: ICD-10-CM

## 2025-06-16 PROBLEM — K57.80 DIVERTICULITIS OF INTESTINE WITH ABSCESS: Status: RESOLVED | Noted: 2025-02-19 | Resolved: 2025-06-16

## 2025-06-16 PROBLEM — M25.561 ACUTE PAIN OF RIGHT KNEE: Status: RESOLVED | Noted: 2020-01-18 | Resolved: 2025-06-16

## 2025-06-16 PROCEDURE — 4010F ACE/ARB THERAPY RXD/TAKEN: CPT | Mod: CPTII,S$GLB,, | Performed by: FAMILY MEDICINE

## 2025-06-16 PROCEDURE — 3008F BODY MASS INDEX DOCD: CPT | Mod: CPTII,S$GLB,, | Performed by: FAMILY MEDICINE

## 2025-06-16 PROCEDURE — 1159F MED LIST DOCD IN RCRD: CPT | Mod: CPTII,S$GLB,, | Performed by: FAMILY MEDICINE

## 2025-06-16 PROCEDURE — 3078F DIAST BP <80 MM HG: CPT | Mod: CPTII,S$GLB,, | Performed by: FAMILY MEDICINE

## 2025-06-16 PROCEDURE — 99999 PR PBB SHADOW E&M-EST. PATIENT-LVL IV: CPT | Mod: PBBFAC,,, | Performed by: FAMILY MEDICINE

## 2025-06-16 PROCEDURE — 99214 OFFICE O/P EST MOD 30 MIN: CPT | Mod: S$GLB,,, | Performed by: FAMILY MEDICINE

## 2025-06-16 PROCEDURE — 3075F SYST BP GE 130 - 139MM HG: CPT | Mod: CPTII,S$GLB,, | Performed by: FAMILY MEDICINE

## 2025-06-16 RX ORDER — ONDANSETRON 4 MG/1
4 TABLET, ORALLY DISINTEGRATING ORAL EVERY 8 HOURS PRN
COMMUNITY
Start: 2025-05-19

## 2025-06-16 RX ORDER — PROMETHAZINE HYDROCHLORIDE 12.5 MG/1
12.5 TABLET ORAL EVERY 6 HOURS PRN
COMMUNITY
Start: 2025-06-05

## 2025-06-16 RX ORDER — AMLODIPINE BESYLATE 5 MG/1
5 TABLET ORAL DAILY
Qty: 90 TABLET | Refills: 1 | Status: SHIPPED | OUTPATIENT
Start: 2025-06-16 | End: 2025-07-16

## 2025-06-16 RX ORDER — PREGABALIN 50 MG/1
50 CAPSULE ORAL 2 TIMES DAILY
Qty: 60 CAPSULE | Refills: 1 | Status: SHIPPED | OUTPATIENT
Start: 2025-06-16 | End: 2025-12-15

## 2025-06-16 RX ORDER — LOSARTAN POTASSIUM 50 MG/1
50 TABLET ORAL DAILY
COMMUNITY
Start: 2025-06-06 | End: 2025-06-16 | Stop reason: SDUPTHER

## 2025-06-16 RX ORDER — OXYCODONE AND ACETAMINOPHEN 5; 325 MG/1; MG/1
1 TABLET ORAL EVERY 6 HOURS PRN
COMMUNITY
Start: 2025-06-05

## 2025-06-16 RX ORDER — LOSARTAN POTASSIUM 50 MG/1
50 TABLET ORAL DAILY
Qty: 90 TABLET | Refills: 1 | Status: SHIPPED | OUTPATIENT
Start: 2025-06-16

## 2025-06-16 RX ORDER — AMLODIPINE BESYLATE 5 MG/1
5 TABLET ORAL DAILY
COMMUNITY
Start: 2025-06-06 | End: 2025-06-16 | Stop reason: SDUPTHER

## 2025-06-16 NOTE — ASSESSMENT & PLAN NOTE
Previous C-spine film reviewed.  Patient with radicular symptoms failure to improve on conservative therapy.  Unable to participate in physical therapy owing to recent colectomy.  MRI C-spine owing to progressive radicular pattern disease process.    Orders:    MRI Cervical Spine Without Contrast; Future    pregabalin (LYRICA) 50 MG capsule; Take 1 capsule (50 mg total) by mouth 2 (two) times daily.

## 2025-06-16 NOTE — ASSESSMENT & PLAN NOTE
Unable to tolerate Gabapentin.  Start Lyrica b.i.d..    Orders:    MRI Cervical Spine Without Contrast; Future    pregabalin (LYRICA) 50 MG capsule; Take 1 capsule (50 mg total) by mouth 2 (two) times daily.

## 2025-06-16 NOTE — PROGRESS NOTES
[facility]      Subjective         Chief Complaint   Patient presents with    Follow-up     Neck and leg, hospital follow up. Diverticulitis       Pleasant 49-year-old female presents for follow up of neck and back pain, chronic with recent worsening.  The patient was evaluated 3 months ago after treatment with intramuscular corticosteroids and Gabapentin.  The patient was unable to take the Gabapentin owing to upset stomach.  She reports the treatment with corticosteroid therapy offered no relief from her pain.  Unfortunately, the patient developed diverticulitis requiring hospitalization and subsequent surgery end of May 2025.  She continues to experience pain in the neck and lumbosacral spine, with radicular symptoms of the upper extremities right-greater-than-left with radicular patterns extending into the hand in the index ring and middle finger on the right hand.  She has radicular pain pattern in the left shoulder arm extending into the hand the thumb of the pinky with weakness numbness and tingling.  X-rays 3 months ago revealed mild scoliosis of the lumbosacral spine.  Moderate degenerative changes were noted.  She has radicular pain extending from the back that extend to the tops of the feet bilaterally.  She denies saddle anesthesia and denies difficulty with bowel and bladder.  She was evaluated 5 or 6 years ago for similar symptoms, completed a course of physical therapy but this did not help much or for very long.  She reports the pain keeps her up at night.  She must constantly move and reposition to achieve some level of comfortable positioning when resting.  She reports it activities of daily living or may difficult because of the discomfort.  She asks what the next steps are.  She does note having been started on antihypertensive medication during her recent hospitalization for which she has been taking with good response.    No past medical history on file.     Past  "Surgical History:   Procedure Laterality Date    COLECTOMY, SIGMOID  05/28/2025    HYSTERECTOMY          Review of patient's allergies indicates:   Allergen Reactions    Sulfa (sulfonamide antibiotics) Rash                   Objective      Vitals:    06/16/25 0833   BP: 130/60   BP Location: Left arm   Patient Position: Sitting   Pulse: 95   SpO2: 99%   Weight: 49.6 kg (109 lb 6.4 oz)   Height: 5' 1" (1.549 m)      Review of Systems   Constitutional: Negative.    HENT: Negative.     Eyes: Negative.    Respiratory: Negative.     Cardiovascular: Negative.    Gastrointestinal: Negative.    Musculoskeletal:  Positive for back pain and neck pain. Negative for joint pain and myalgias.   Skin: Negative.    Neurological: Negative.    Endo/Heme/Allergies: Negative.    Psychiatric/Behavioral: Negative.          Objective  Physical Exam  Vitals reviewed.   Constitutional:       Appearance: Normal appearance.   HENT:      Head: Normocephalic and atraumatic.      Right Ear: Tympanic membrane normal.      Left Ear: Tympanic membrane normal.      Mouth/Throat:      Mouth: Mucous membranes are dry.   Cardiovascular:      Rate and Rhythm: Normal rate and regular rhythm.      Pulses: Normal pulses.   Pulmonary:      Effort: Pulmonary effort is normal.      Breath sounds: Normal breath sounds.    Musculoskeletal:         General: Normal range of motion.      Cervical back: Neck supple.      Comments: Cervical spine:  No midline tenderness, rotation left and right 55° left, 65° right mild subjective discomfort.  Full flexion-extension of the neck 60° and 55° respectively mild crepitance noted moderate C-spine tenderness.  Moderate paraspinal tenderness C5-7 right-greater-than-left.     Lumbar spine:  No midline tenderness.  Forward flexion and extension reasonable range but with subjective discomfort Subjective tenderness L2-S1 paraspinal right-greater-than-left.   Skin:     General: Skin is warm and dry.   Neurological:      General: " "No focal deficit present.      Mental Status: She is alert.   Psychiatric:         Mood and Affect: Mood flat, affect constricted speech clear thoughts coherent and tangential good eye contact insight and judgment reasonable        No results found for: "TSH"   No results found for: "LABA1C", "HGBA1C" No results found for: "LABA1C", "HGBA1C"  CMP  Sodium   Date Value Ref Range Status   02/21/2025 137 136 - 145 mmol/L Final   02/20/2025 137 136 - 145 mmol/L Final   02/20/2025 134 (L) 136 - 145 mmol/L Final     Potassium   Date Value Ref Range Status   05/27/2025 3.8 3.5 - 5.1 mmol/L Final   02/21/2025 3.6 3.5 - 5.1 mmol/L Final   02/20/2025 3.6 3.5 - 5.1 mmol/L Final   02/20/2025 4.0 3.5 - 5.1 mmol/L Final     Chloride   Date Value Ref Range Status   02/21/2025 107 95 - 110 mmol/L Final   02/20/2025 111 (H) 95 - 110 mmol/L Final   02/20/2025 109 95 - 110 mmol/L Final     CO2   Date Value Ref Range Status   02/21/2025 22 (L) 23 - 29 mmol/L Final   02/20/2025 18 (L) 23 - 29 mmol/L Final   02/20/2025 10 (L) 23 - 29 mmol/L Final     Glucose   Date Value Ref Range Status   02/21/2025 112 (H) 70 - 110 mg/dL Final   02/20/2025 170 (H) 70 - 110 mg/dL Final   02/20/2025 43 (LL) 70 - 110 mg/dL Final     Comment:     Glu critical result(s) called and verbal readback obtained from   cheli Ornelas by CHELI 02/20/2025 10:45       BUN   Date Value Ref Range Status   02/21/2025 6 6 - 20 mg/dL Final   02/20/2025 11 6 - 20 mg/dL Final   02/20/2025 11 6 - 20 mg/dL Final     Creatinine   Date Value Ref Range Status   02/21/2025 0.7 0.5 - 1.4 mg/dL Final   02/20/2025 0.7 0.5 - 1.4 mg/dL Final   02/20/2025 0.7 0.5 - 1.4 mg/dL Final     Calcium   Date Value Ref Range Status   02/21/2025 9.2 8.7 - 10.5 mg/dL Final   02/20/2025 8.8 8.7 - 10.5 mg/dL Final   02/20/2025 8.8 8.7 - 10.5 mg/dL Final     Total Protein   Date Value Ref Range Status   02/20/2025 6.5 6.0 - 8.4 g/dL Final   02/19/2025 6.6 6.0 - 8.4 g/dL Final   02/18/2025 7.7 6.0 - 8.4 " g/dL Final     Albumin   Date Value Ref Range Status   02/20/2025 3.0 (L) 3.5 - 5.2 g/dL Final   02/19/2025 3.3 (L) 3.5 - 5.2 g/dL Final   02/18/2025 3.9 3.5 - 5.2 g/dL Final     Albumin Level   Date Value Ref Range Status   06/01/2025 3.5 3.2 - 4.8 g/dL Final     Total Bilirubin   Date Value Ref Range Status   02/20/2025 1.3 (H) 0.1 - 1.0 mg/dL Final     Comment:     For infants and newborns, interpretation of results should be based  on gestational age, weight and in agreement with clinical  observations.    Premature Infant recommended reference ranges:  Up to 24 hours.............<8.0 mg/dL  Up to 48 hours............<12.0 mg/dL  3-5 days..................<15.0 mg/dL  6-29 days.................<15.0 mg/dL     02/19/2025 1.5 (H) 0.1 - 1.0 mg/dL Final     Comment:     For infants and newborns, interpretation of results should be based  on gestational age, weight and in agreement with clinical  observations.    Premature Infant recommended reference ranges:  Up to 24 hours.............<8.0 mg/dL  Up to 48 hours............<12.0 mg/dL  3-5 days..................<15.0 mg/dL  6-29 days.................<15.0 mg/dL     02/18/2025 1.1 (H) 0.1 - 1.0 mg/dL Final     Comment:     For infants and newborns, interpretation of results should be based  on gestational age, weight and in agreement with clinical  observations.    Premature Infant recommended reference ranges:  Up to 24 hours.............<8.0 mg/dL  Up to 48 hours............<12.0 mg/dL  3-5 days..................<15.0 mg/dL  6-29 days.................<15.0 mg/dL       Alkaline Phosphatase   Date Value Ref Range Status   02/20/2025 73 40 - 150 U/L Final   02/19/2025 72 40 - 150 U/L Final   02/18/2025 82 40 - 150 U/L Final     Alk Phos   Date Value Ref Range Status   06/01/2025 109 46 - 116 IU/L Final     AST   Date Value Ref Range Status   02/20/2025 10 10 - 40 U/L Final   02/19/2025 9 (L) 10 - 40 U/L Final   02/18/2025 10 10 - 40 U/L Final     Aspartate  "Aminotransferase   Date Value Ref Range Status   06/01/2025 22 <34 IU/L Final     ALT   Date Value Ref Range Status   02/20/2025 <5 (L) 10 - 44 U/L Final   02/19/2025 <5 (L) 10 - 44 U/L Final   02/18/2025 5 (L) 10 - 44 U/L Final     Alanine Aminotransferase   Date Value Ref Range Status   06/01/2025 11 10 - 49 IU/L Final     Anion Gap   Date Value Ref Range Status   02/21/2025 8 8 - 16 mmol/L Final   02/20/2025 8 8 - 16 mmol/L Final   02/20/2025 15 8 - 16 mmol/L Final    No results found for: "CHOL"  No results found for: "HDL"  No results found for: "LDLCALC"  No results found for: "TRIG"  No results found for: "CHOLHDL"       Medication List with Changes/Refills   New Medications    PREGABALIN (LYRICA) 50 MG CAPSULE    Take 1 capsule (50 mg total) by mouth 2 (two) times daily.   Current Medications    CHLORHEXIDINE (PERIDEX) 0.12 % SOLUTION    15 mLs 2 (two) times daily.    GABAPENTIN (NEURONTIN) 300 MG CAPSULE    Take 1 capsule (300 mg total) by mouth 2 (two) times daily.    ONDANSETRON (ZOFRAN-ODT) 4 MG TBDL    Take 4 mg by mouth every 8 (eight) hours as needed.    OXYCODONE-ACETAMINOPHEN (PERCOCET) 5-325 MG PER TABLET    Take 1 tablet by mouth every 6 (six) hours as needed.    PROMETHAZINE (PHENERGAN) 12.5 MG TAB    Take 12.5 mg by mouth every 6 (six) hours as needed.   Changed and/or Refilled Medications    Modified Medication Previous Medication    AMLODIPINE (NORVASC) 5 MG TABLET amLODIPine (NORVASC) 5 MG tablet       Take 1 tablet (5 mg total) by mouth once daily.    Take 5 mg by mouth once daily.    LOSARTAN (COZAAR) 50 MG TABLET losartan (COZAAR) 50 MG tablet       Take 1 tablet (50 mg total) by mouth once daily.    Take 50 mg by mouth once daily.   Discontinued Medications    DICYCLOMINE (BENTYL) 20 MG TABLET    Take 20 mg by mouth 2 (two) times daily.    METRONIDAZOLE (FLAGYL) 500 MG TABLET    Take 1 tablet (500 mg total) by mouth every 8 (eight) hours.           Assessment & Plan     Assessment & " Plan  Lumbar radiculopathy  Failure to improve with interval worsening.  Patient with radicular symptoms of the lower extremities right-greater-than-left extending bilaterally to the top of the feet.  The patient has recently completed surgery/colectomy secondary to diverticulitis and we will not be able to participate in physical therapy for an additional 30 days.  MRI lumbosacral spine, scoliosis present with likely nerve root involvement.    Orders:    MRI Lumbar Spine Without Contrast; Future    pregabalin (LYRICA) 50 MG capsule; Take 1 capsule (50 mg total) by mouth 2 (two) times daily.    Other idiopathic scoliosis, thoracolumbar region  Incidental to lumbosacral x-ray    Orders:    MRI Lumbar Spine Without Contrast; Future    Screening for cardiovascular condition  Recommendations for screening, reviewed ordered    Orders:    Lipid panel; Future    Cervicalgia  Previous C-spine film reviewed.  Patient with radicular symptoms failure to improve on conservative therapy.  Unable to participate in physical therapy owing to recent colectomy.  MRI C-spine owing to progressive radicular pattern disease process.    Orders:    MRI Cervical Spine Without Contrast; Future    pregabalin (LYRICA) 50 MG capsule; Take 1 capsule (50 mg total) by mouth 2 (two) times daily.    Radiculopathy affecting upper extremity  Unable to tolerate Gabapentin.  Start Lyrica b.i.d..    Orders:    MRI Cervical Spine Without Contrast; Future    pregabalin (LYRICA) 50 MG capsule; Take 1 capsule (50 mg total) by mouth 2 (two) times daily.     Follow up 4 weeks.  Consider physical therapy for neck and back pain.  Await MRI.  Red flags warnings were reviewed with the patient she understands and elects to proceed.     Christopher L Jones, MD [facility]  [address] [place] [address],[place] [address]  [phone]

## 2025-06-16 NOTE — ASSESSMENT & PLAN NOTE
Failure to improve with interval worsening.  Patient with radicular symptoms of the lower extremities right-greater-than-left extending bilaterally to the top of the feet.  The patient has recently completed surgery/colectomy secondary to diverticulitis and we will not be able to participate in physical therapy for an additional 30 days.  MRI lumbosacral spine, scoliosis present with likely nerve root involvement.    Orders:    MRI Lumbar Spine Without Contrast; Future    pregabalin (LYRICA) 50 MG capsule; Take 1 capsule (50 mg total) by mouth 2 (two) times daily.

## 2025-07-11 ENCOUNTER — TELEPHONE (OUTPATIENT)
Dept: FAMILY MEDICINE | Facility: CLINIC | Age: 50
End: 2025-07-11
Payer: COMMERCIAL

## 2025-07-11 NOTE — TELEPHONE ENCOUNTER
Referral Message:     If the patient has not completed physical therapy as outlined below, she will need to prior to approval of MRIs by her insurance.     Please let the patient know her MRI authorization was denied by her insurance.  If she wishes, I can refer her to orthopedics for further assessment/management.

## 2025-07-11 NOTE — LETTER
July 15, 2025    Alysia Meyer  [address] [facility] [place] [address]             [facility]  [address] [place] [address]  Phone: [phone]  Fax: [phone] Dear Ms. Cruz:    We have been trying to reach you regarding the ordered MRI and have been unsuccessful.    If you have any questions or concerns, please don't hesitate to call.    Sincerely,        Alex Gamboa MD